# Patient Record
Sex: MALE | Race: WHITE | Employment: OTHER | URBAN - METROPOLITAN AREA
[De-identification: names, ages, dates, MRNs, and addresses within clinical notes are randomized per-mention and may not be internally consistent; named-entity substitution may affect disease eponyms.]

---

## 2017-07-11 ENCOUNTER — TELEPHONE (OUTPATIENT)
Dept: INTERNAL MEDICINE CLINIC | Age: 62
End: 2017-07-11

## 2017-07-11 DIAGNOSIS — Z12.5 PROSTATE CANCER SCREENING: Primary | ICD-10-CM

## 2017-09-12 ENCOUNTER — OFFICE VISIT (OUTPATIENT)
Dept: INTERNAL MEDICINE CLINIC | Age: 62
End: 2017-09-12

## 2017-09-12 VITALS
HEART RATE: 68 BPM | BODY MASS INDEX: 24.05 KG/M2 | SYSTOLIC BLOOD PRESSURE: 122 MMHG | WEIGHT: 168 LBS | DIASTOLIC BLOOD PRESSURE: 70 MMHG | HEIGHT: 70 IN

## 2017-09-12 DIAGNOSIS — Z83.71 FAMILY HX COLONIC POLYPS: ICD-10-CM

## 2017-09-12 DIAGNOSIS — Z00.00 WELL ADULT EXAM: ICD-10-CM

## 2017-09-12 DIAGNOSIS — E78.00 PURE HYPERCHOLESTEROLEMIA: Primary | ICD-10-CM

## 2017-09-12 LAB
BILIRUBIN, POC: NORMAL
BLOOD URINE, POC: NORMAL
CLARITY, POC: CLEAR
COLOR, POC: NORMAL
GLUCOSE URINE, POC: NORMAL
KETONES, POC: NORMAL
LEUKOCYTE EST, POC: NORMAL
NITRITE, POC: NORMAL
PH, POC: 5
PROTEIN, POC: NORMAL
SPECIFIC GRAVITY, POC: 1.01
UROBILINOGEN, POC: 1

## 2017-09-12 PROCEDURE — 93000 ELECTROCARDIOGRAM COMPLETE: CPT | Performed by: INTERNAL MEDICINE

## 2017-09-12 PROCEDURE — 81002 URINALYSIS NONAUTO W/O SCOPE: CPT | Performed by: INTERNAL MEDICINE

## 2017-09-12 PROCEDURE — 99396 PREV VISIT EST AGE 40-64: CPT | Performed by: INTERNAL MEDICINE

## 2017-09-12 RX ORDER — ATORVASTATIN CALCIUM 10 MG/1
TABLET, FILM COATED ORAL
Qty: 90 TABLET | Refills: 3 | Status: SHIPPED | OUTPATIENT
Start: 2017-09-12 | End: 2018-09-07 | Stop reason: SDUPTHER

## 2017-09-12 ASSESSMENT — PATIENT HEALTH QUESTIONNAIRE - PHQ9
1. LITTLE INTEREST OR PLEASURE IN DOING THINGS: 0
SUM OF ALL RESPONSES TO PHQ9 QUESTIONS 1 & 2: 0
SUM OF ALL RESPONSES TO PHQ QUESTIONS 1-9: 0
2. FEELING DOWN, DEPRESSED OR HOPELESS: 0

## 2017-11-02 ENCOUNTER — HOSPITAL ENCOUNTER (OUTPATIENT)
Dept: ENDOSCOPY | Age: 62
Discharge: HOME | End: 2017-11-02
Attending: INTERNAL MEDICINE | Admitting: INTERNAL MEDICINE

## 2017-11-02 RX ORDER — SODIUM CHLORIDE 0.9 % (FLUSH) 0.9 %
10 SYRINGE (ML) INJECTION PRN
Status: CANCELLED | OUTPATIENT
Start: 2017-11-02

## 2017-11-02 RX ORDER — SODIUM CHLORIDE 0.9 % (FLUSH) 0.9 %
10 SYRINGE (ML) INJECTION EVERY 12 HOURS SCHEDULED
Status: CANCELLED | OUTPATIENT
Start: 2017-11-02

## 2017-11-02 RX ORDER — SODIUM CHLORIDE 9 MG/ML
INJECTION, SOLUTION INTRAVENOUS CONTINUOUS
Status: CANCELLED | OUTPATIENT
Start: 2017-11-02

## 2017-11-02 ASSESSMENT — ENCOUNTER SYMPTOMS: SHORTNESS OF BREATH: 0

## 2017-11-02 NOTE — ANESTHESIA POST-OP
[facility] Anesthesiology  Post-Anesthesia Note       Name:  You Carpio                                         Age:  58 y.o. MRN:  [id number]     Last Vitals & Oxygen Saturation: There were no vitals taken for this visit. No data found. Level of consciousness:  Awake, alert    Respiratory: Respirations easy, no distress. Stable. Cardiovascular: Hemodynamically stable. Hydration: Adequate. PONV: Adequately managed. Post-op pain: Adequately controlled. Post-op assessment: Tolerated anesthetic well without complication. Complications:  None.     Inna Maradiaga MD  November 2, 2017   12:59 PM

## 2017-11-02 NOTE — ANESTHESIA PRE-OP
by mouth daily 90 tablet 3    aspirin 81 MG chewable tablet Take 81 mg by mouth daily.  therapeutic multivitamin-minerals (THERAGRAN-M) tablet Take 1 tablet by mouth daily.  Calcium Carb-Cholecalciferol (CALCIUM PLUS VITAMIN D3) 600-500 MG-UNIT CAPS Take 1 tablet by mouth daily.  Flaxseed, Linseed, 1000 MG CAPS Take 1 capsule by mouth daily.  Saw Palmetto, Serenoa repens, 450 MG CAPS Take 1 capsule by mouth daily. No current facility-administered medications for this encounter. Vital Signs  (Current) There were no vitals filed for this visit.   (for past 48 hrs)  No Data Recorded  (last three values)   BP Readings from Last 3 Encounters:   09/12/17 122/70   09/28/16 140/74   06/08/16 132/76       CBC  Lab Results   Component Value Date    WBC 4.8 08/29/2017    RBC 4.97 08/29/2017    HGB 15.3 08/29/2017    HCT 45.5 08/29/2017    MCV 92 08/29/2017    RDW 13.0 08/29/2017     08/29/2017       CMP    Lab Results   Component Value Date     08/29/2017    K 5.1 08/29/2017     08/29/2017    CO2 28 08/29/2017    BUN 21 08/29/2017    CREATININE 1.16 08/29/2017    GFRAA 78 08/29/2017    GFRAA >60 10/05/2012    AGRATIO 2.5 08/29/2017    LABGLOM 67 08/29/2017    GLUCOSE 90 08/29/2017    PROT 6.3 08/29/2017    PROT 6.8 10/05/2012    CALCIUM 9.8 08/29/2017    BILITOT 0.6 08/29/2017    ALKPHOS 48 08/29/2017    AST 24 08/29/2017    ALT 20 08/29/2017       BMP    Lab Results   Component Value Date     08/29/2017    K 5.1 08/29/2017     08/29/2017    CO2 28 08/29/2017    BUN 21 08/29/2017    CREATININE 1.16 08/29/2017    CALCIUM 9.8 08/29/2017    GFRAA 78 08/29/2017    GFRAA >60 10/05/2012    LABGLOM 67 08/29/2017    GLUCOSE 90 08/29/2017       Coags   No results found for: PROTIME, INR, APTT    HCG (If Applicable) No results found for: PREGTESTUR, PREGSERUM, HCG, HCGQUANT     ABGs  No results found for: PHART, PO2ART, RRI0CWP, QSN2VNS, BEART, C3HYWWCD     Type & Screen (If Applicable)  No results found for: LABABO, LABRH      POCGlucose  No results for input(s): GLUCOSE in the last 72 hours. NPO Status  > 8 hours                       BMI  There is no height or weight on file to calculate BMI. Estimated body mass index is 24.11 kg/m² as calculated from the following:    Height as of 9/12/17: 5' 10\" (1.778 m). Weight as of 9/12/17: 168 lb (76.2 kg). Additional Testing (Echo, Stress, ECG, PFTs, etc)        Anesthesia Evaluation  Patient summary reviewed and Nursing notes reviewed no history of anesthetic complications:   Airway: Mallampati: II  TM distance: >3 FB   Neck ROM: full  Mouth opening: > = 3 FB Dental:          Pulmonary:       (-) pneumonia, COPD, asthma, shortness of breath, recent URI and sleep apnea                           Cardiovascular:  Exercise tolerance: good (>4 METS),       (-) pacemaker, hypertension, valvular problems/murmurs, past MI, CAD, CABG/stent, dysrhythmias,  angina,  CHF, orthopnea, PND and  LIMON      Rhythm: regular  Rate: normal                    Neuro/Psych:      (-) seizures, neuromuscular disease, TIA, CVA, headaches and psychiatric history           GI/Hepatic/Renal:   (+) GERD:,      (-) hiatal hernia, PUD, hepatitis, liver disease and bowel prep       Endo/Other:        (-) hypothyroidism, hyperthyroidism, blood dyscrasia, arthritis, no Type II DM, no Type I DM               Abdominal:           Vascular:                                      Anesthesia Plan      MAC     ASA 2       Induction: intravenous. Anesthetic plan and risks discussed with patient. Plan discussed with CRNA. DOS STAFF ADDENDUM:    Pt seen and examined, chart reviewed (including anesthesia, drug and allergy history). No interval changes to history and physical examination. Anesthetic plan, risks, benefits, alternatives, and personnel involved discussed with patient. Patient verbalized an understanding and agrees to proceed. Pamela Abdi MD  November 2, 2017  6:15 AM

## 2017-12-15 ENCOUNTER — OFFICE VISIT (OUTPATIENT)
Dept: INTERNAL MEDICINE CLINIC | Age: 62
End: 2017-12-15

## 2017-12-15 VITALS
HEIGHT: 70 IN | HEART RATE: 64 BPM | SYSTOLIC BLOOD PRESSURE: 120 MMHG | BODY MASS INDEX: 23.91 KG/M2 | TEMPERATURE: 97.4 F | DIASTOLIC BLOOD PRESSURE: 66 MMHG | WEIGHT: 167 LBS

## 2017-12-15 DIAGNOSIS — J01.00 ACUTE NON-RECURRENT MAXILLARY SINUSITIS: Primary | ICD-10-CM

## 2017-12-15 PROCEDURE — 1036F TOBACCO NON-USER: CPT | Performed by: INTERNAL MEDICINE

## 2017-12-15 PROCEDURE — G8427 DOCREV CUR MEDS BY ELIG CLIN: HCPCS | Performed by: INTERNAL MEDICINE

## 2017-12-15 PROCEDURE — 3017F COLORECTAL CA SCREEN DOC REV: CPT | Performed by: INTERNAL MEDICINE

## 2017-12-15 PROCEDURE — G8420 CALC BMI NORM PARAMETERS: HCPCS | Performed by: INTERNAL MEDICINE

## 2017-12-15 PROCEDURE — G8482 FLU IMMUNIZE ORDER/ADMIN: HCPCS | Performed by: INTERNAL MEDICINE

## 2017-12-15 PROCEDURE — 99213 OFFICE O/P EST LOW 20 MIN: CPT | Performed by: INTERNAL MEDICINE

## 2017-12-15 RX ORDER — AZELASTINE 1 MG/ML
2 SPRAY, METERED NASAL 2 TIMES DAILY
Qty: 1 BOTTLE | Refills: 3 | Status: SHIPPED | OUTPATIENT
Start: 2017-12-15 | End: 2018-09-24

## 2017-12-15 RX ORDER — AMOXICILLIN 500 MG
1 CAPSULE ORAL DAILY
COMMUNITY

## 2017-12-15 RX ORDER — AMOXICILLIN AND CLAVULANATE POTASSIUM 875; 125 MG/1; MG/1
1 TABLET, FILM COATED ORAL 2 TIMES DAILY
Qty: 20 TABLET | Refills: 0 | Status: SHIPPED | OUTPATIENT
Start: 2017-12-15 | End: 2017-12-25

## 2018-09-07 ENCOUNTER — TELEPHONE (OUTPATIENT)
Dept: INTERNAL MEDICINE CLINIC | Age: 63
End: 2018-09-07

## 2018-09-07 DIAGNOSIS — E78.00 PURE HYPERCHOLESTEROLEMIA: ICD-10-CM

## 2018-09-07 RX ORDER — ATORVASTATIN CALCIUM 10 MG/1
TABLET, FILM COATED ORAL
Qty: 90 TABLET | Refills: 3 | Status: SHIPPED | OUTPATIENT
Start: 2018-09-07 | End: 2019-11-04 | Stop reason: SDUPTHER

## 2018-09-07 NOTE — TELEPHONE ENCOUNTER
Pt called and requested a 90 day refill on Lipitor sent to [facility] on [facility]

## 2018-09-24 ENCOUNTER — OFFICE VISIT (OUTPATIENT)
Dept: INTERNAL MEDICINE CLINIC | Age: 63
End: 2018-09-24

## 2018-09-24 VITALS
BODY MASS INDEX: 24.48 KG/M2 | SYSTOLIC BLOOD PRESSURE: 120 MMHG | DIASTOLIC BLOOD PRESSURE: 70 MMHG | WEIGHT: 171 LBS | HEART RATE: 76 BPM | HEIGHT: 70 IN

## 2018-09-24 DIAGNOSIS — R97.20 PSA ELEVATION: ICD-10-CM

## 2018-09-24 DIAGNOSIS — Z00.00 ANNUAL PHYSICAL EXAM: Primary | ICD-10-CM

## 2018-09-24 LAB
BILIRUBIN, POC: NORMAL
BLOOD URINE, POC: NORMAL
CLARITY, POC: CLEAR
COLOR, POC: NORMAL
GLUCOSE URINE, POC: NORMAL
KETONES, POC: NORMAL
LEUKOCYTE EST, POC: NORMAL
NITRITE, POC: NORMAL
PH, POC: 8
PROTEIN, POC: NORMAL
SPECIFIC GRAVITY, POC: 1
UROBILINOGEN, POC: 1

## 2018-09-24 PROCEDURE — 93000 ELECTROCARDIOGRAM COMPLETE: CPT | Performed by: INTERNAL MEDICINE

## 2018-09-24 PROCEDURE — 99396 PREV VISIT EST AGE 40-64: CPT | Performed by: INTERNAL MEDICINE

## 2018-09-24 PROCEDURE — 81002 URINALYSIS NONAUTO W/O SCOPE: CPT | Performed by: INTERNAL MEDICINE

## 2018-09-24 ASSESSMENT — PATIENT HEALTH QUESTIONNAIRE - PHQ9
SUM OF ALL RESPONSES TO PHQ QUESTIONS 1-9: 0
SUM OF ALL RESPONSES TO PHQ QUESTIONS 1-9: 0
SUM OF ALL RESPONSES TO PHQ9 QUESTIONS 1 & 2: 0
2. FEELING DOWN, DEPRESSED OR HOPELESS: 0
1. LITTLE INTEREST OR PLEASURE IN DOING THINGS: 0

## 2018-09-24 NOTE — PROGRESS NOTES
Subjective:      Patient ID: Shruthi Mccauley is a 61 y.o. male. HPI-here for annual CPX-no complaints or problems  Wants his KATY done  MY DAD HAD A BIOPSY   I WASN'T TO SEE UROLOGY SINCE MY PSA WAS 3.6    Review of Systems-had colon 11/2017, next due 2027    Current Outpatient Prescriptions on File Prior to Visit   Medication Sig Dispense Refill    atorvastatin (LIPITOR) 10 MG tablet TAKE 1 TABLET BY MOUTH ONE TIME A DAY 90 tablet 3    RaNITidine HCl (ZANTAC 150 MAXIMUM STRENGTH PO) Take 1 tablet by mouth 2 times daily      Omega-3 Fatty Acids (FISH OIL) 1200 MG CAPS Take 1 capsule by mouth daily      aspirin 81 MG chewable tablet Take 81 mg by mouth daily.  therapeutic multivitamin-minerals (THERAGRAN-M) tablet Take 1 tablet by mouth daily.  Calcium Carb-Cholecalciferol (CALCIUM PLUS VITAMIN D3) 600-500 MG-UNIT CAPS Take 1 tablet by mouth daily.  Saw Palmetto, Serenoa repens, 450 MG CAPS Take 1 capsule by mouth daily. No current facility-administered medications on file prior to visit. Objective:   Physical Exam   Constitutional: He is oriented to person, place, and time. He appears well-developed and well-nourished. /70   Pulse 76   Ht 5' 10\" (1.778 m)   Wt 171 lb (77.6 kg)   BMI 24.54 kg/m²    Neck: No JVD present. Cardiovascular: Normal rate, regular rhythm and normal heart sounds. Pulmonary/Chest: Effort normal and breath sounds normal.   Abdominal: Soft. Bowel sounds are normal.   Musculoskeletal: Normal range of motion. Neurological: He is alert and oriented to person, place, and time. Psychiatric: He has a normal mood and affect. His behavior is normal. Thought content normal.   Nursing note and vitals reviewed.     Lab Results   Component Value Date    PSA 3.6 09/05/2018    PSA 2.8 08/29/2017    PSA 2.5 08/31/2016     Lab Results   Component Value Date    WBC 3.9 09/05/2018    HGB 14.3 09/05/2018    HCT 43.6 09/05/2018    MCV 91 09/05/2018

## 2018-09-24 NOTE — PATIENT INSTRUCTIONS
Annual fluvax is advised every Fall    Consider Shingrix series of 2 shots over 2-6 month for shingles protection

## 2019-04-01 ENCOUNTER — OFFICE VISIT (OUTPATIENT)
Dept: INTERNAL MEDICINE CLINIC | Age: 64
End: 2019-04-01

## 2019-04-01 VITALS
HEART RATE: 97 BPM | WEIGHT: 168 LBS | BODY MASS INDEX: 24.11 KG/M2 | DIASTOLIC BLOOD PRESSURE: 90 MMHG | OXYGEN SATURATION: 100 % | SYSTOLIC BLOOD PRESSURE: 140 MMHG

## 2019-04-01 DIAGNOSIS — H92.02 LEFT EAR PAIN: Primary | ICD-10-CM

## 2019-04-01 PROCEDURE — 3017F COLORECTAL CA SCREEN DOC REV: CPT | Performed by: INTERNAL MEDICINE

## 2019-04-01 PROCEDURE — G8428 CUR MEDS NOT DOCUMENT: HCPCS | Performed by: INTERNAL MEDICINE

## 2019-04-01 PROCEDURE — 99213 OFFICE O/P EST LOW 20 MIN: CPT | Performed by: INTERNAL MEDICINE

## 2019-04-01 PROCEDURE — G8420 CALC BMI NORM PARAMETERS: HCPCS | Performed by: INTERNAL MEDICINE

## 2019-04-01 PROCEDURE — 1036F TOBACCO NON-USER: CPT | Performed by: INTERNAL MEDICINE

## 2019-04-01 RX ORDER — MONTELUKAST SODIUM 10 MG/1
10 TABLET ORAL DAILY
Qty: 30 TABLET | Refills: 0 | Status: SHIPPED | OUTPATIENT
Start: 2019-04-01 | End: 2020-06-19 | Stop reason: ALTCHOICE

## 2019-04-01 NOTE — PROGRESS NOTES
dysfuction      Plan:      Afrin NS each nostrils BID x 3 days  Start Singulair 10 mg QD x 1 month, then transition to OTC antihistamines as needed    Vickie Whitley MD

## 2019-07-15 ENCOUNTER — TELEPHONE (OUTPATIENT)
Dept: PRIMARY CARE CLINIC | Age: 64
End: 2019-07-15

## 2019-07-15 DIAGNOSIS — Z00.00 ROUTINE GENERAL MEDICAL EXAMINATION AT A HEALTH CARE FACILITY: Primary | ICD-10-CM

## 2019-07-15 DIAGNOSIS — Z12.5 SCREENING FOR PROSTATE CANCER: ICD-10-CM

## 2019-09-23 ENCOUNTER — PATIENT MESSAGE (OUTPATIENT)
Dept: PRIMARY CARE CLINIC | Age: 64
End: 2019-09-23

## 2019-09-25 ENCOUNTER — OFFICE VISIT (OUTPATIENT)
Dept: PRIMARY CARE CLINIC | Age: 64
End: 2019-09-25
Payer: COMMERCIAL

## 2019-09-25 VITALS
HEIGHT: 71 IN | DIASTOLIC BLOOD PRESSURE: 82 MMHG | WEIGHT: 172.4 LBS | HEART RATE: 73 BPM | BODY MASS INDEX: 24.14 KG/M2 | OXYGEN SATURATION: 99 % | SYSTOLIC BLOOD PRESSURE: 126 MMHG | TEMPERATURE: 97.5 F

## 2019-09-25 DIAGNOSIS — Z23 NEED FOR VACCINATION FOR H FLU TYPE B: ICD-10-CM

## 2019-09-25 DIAGNOSIS — N40.1 BENIGN PROSTATIC HYPERPLASIA WITH URINARY HESITANCY: ICD-10-CM

## 2019-09-25 DIAGNOSIS — Z12.11 COLON CANCER SCREENING: ICD-10-CM

## 2019-09-25 DIAGNOSIS — R39.11 BENIGN PROSTATIC HYPERPLASIA WITH URINARY HESITANCY: ICD-10-CM

## 2019-09-25 DIAGNOSIS — Z12.5 PROSTATE CANCER SCREENING: ICD-10-CM

## 2019-09-25 DIAGNOSIS — Z00.00 ROUTINE GENERAL MEDICAL EXAMINATION AT A HEALTH CARE FACILITY: Primary | ICD-10-CM

## 2019-09-25 LAB
BILIRUBIN, POC: ABNORMAL
BLOOD URINE, POC: ABNORMAL
CLARITY, POC: ABNORMAL
COLOR, POC: YELLOW
GLUCOSE URINE, POC: ABNORMAL
KETONES, POC: ABNORMAL
LEUKOCYTE EST, POC: ABNORMAL
NITRITE, POC: ABNORMAL
PH, POC: 7
PROTEIN, POC: ABNORMAL
SPECIFIC GRAVITY, POC: 1.01
UROBILINOGEN, POC: 0.2

## 2019-09-25 PROCEDURE — 99213 OFFICE O/P EST LOW 20 MIN: CPT | Performed by: INTERNAL MEDICINE

## 2019-09-25 PROCEDURE — 81002 URINALYSIS NONAUTO W/O SCOPE: CPT | Performed by: INTERNAL MEDICINE

## 2019-09-25 PROCEDURE — 90686 IIV4 VACC NO PRSV 0.5 ML IM: CPT | Performed by: INTERNAL MEDICINE

## 2019-09-25 PROCEDURE — 90471 IMMUNIZATION ADMIN: CPT | Performed by: INTERNAL MEDICINE

## 2019-09-25 RX ORDER — TAMSULOSIN HYDROCHLORIDE 0.4 MG/1
0.4 CAPSULE ORAL DAILY
Qty: 30 CAPSULE | Refills: 3 | Status: SHIPPED | OUTPATIENT
Start: 2019-09-25 | End: 2020-06-19 | Stop reason: ALTCHOICE

## 2019-09-25 ASSESSMENT — ENCOUNTER SYMPTOMS
COUGH: 0
SHORTNESS OF BREATH: 0

## 2019-09-25 ASSESSMENT — PATIENT HEALTH QUESTIONNAIRE - PHQ9
1. LITTLE INTEREST OR PLEASURE IN DOING THINGS: 0
SUM OF ALL RESPONSES TO PHQ9 QUESTIONS 1 & 2: 0
SUM OF ALL RESPONSES TO PHQ QUESTIONS 1-9: 0
SUM OF ALL RESPONSES TO PHQ QUESTIONS 1-9: 0
2. FEELING DOWN, DEPRESSED OR HOPELESS: 0

## 2019-09-25 NOTE — PROGRESS NOTES
Vaccine Information Sheet, \"Influenza - Inactivated\"  given to Tavo Mujica, or parent/legal guardian of  Tavo Mujica and verbalized understanding. Patient responses:    Have you ever had a reaction to a flu vaccine? No  Are you able to eat eggs without adverse effects? Yes  Do you have any current illness? No  Have you ever had Guillian Mason Syndrome? No    Flu vaccine given per order. Please see immunization tab. Janine [place]

## 2019-09-25 NOTE — PROGRESS NOTES
Chief Complaint   Patient presents with    Annual Exam     CPE, would like to discuss enlarged prostate     Subjective:      Patient ID: Jaren Cornejo is a 59 y.o. male. HPI-presents for a CPX  \"wants to discuss enlarged prostate\", already on TXU Omar  I saw Urology [facility] last year for elevated PSA    Review of Systems   Respiratory: Negative for cough and shortness of breath. Cardiovascular: Positive for chest pain. Negative for palpitations and leg swelling. Denies any exertional symptoms        Current Outpatient Medications on File Prior to Visit   Medication Sig Dispense Refill    montelukast (SINGULAIR) 10 MG tablet Take 1 tablet by mouth daily 30 tablet 0    atorvastatin (LIPITOR) 10 MG tablet TAKE 1 TABLET BY MOUTH ONE TIME A DAY 90 tablet 3    RaNITidine HCl (ZANTAC 150 MAXIMUM STRENGTH PO) Take 1 tablet by mouth 2 times daily      Omega-3 Fatty Acids (FISH OIL) 1200 MG CAPS Take 1 capsule by mouth daily      aspirin 81 MG chewable tablet Take 81 mg by mouth daily.  therapeutic multivitamin-minerals (THERAGRAN-M) tablet Take 1 tablet by mouth daily.  Calcium Carb-Cholecalciferol (CALCIUM PLUS VITAMIN D3) 600-500 MG-UNIT CAPS Take 1 tablet by mouth daily.  Saw Palmetto, Serenoa repens, 450 MG CAPS Take 1 capsule by mouth daily. No current facility-administered medications on file prior to visit. Objective:   Physical Exam   Constitutional: He is oriented to person, place, and time. He appears well-developed and well-nourished. No distress. /82 (Site: Left Upper Arm, Position: Sitting, Cuff Size: Medium Adult)   Pulse 73   Temp 97.5 °F (36.4 °C) (Tympanic)   Ht 5' 11\" (1.803 m)   Wt 172 lb 6.4 oz (78.2 kg)   SpO2 99%   BMI 24.04 kg/m²    HENT:   Head: Normocephalic and atraumatic. Eyes: Pupils are equal, round, and reactive to light. Conjunctivae are normal. Right eye exhibits no discharge. Left eye exhibits no discharge.  No

## 2019-11-04 ENCOUNTER — OFFICE VISIT (OUTPATIENT)
Dept: PRIMARY CARE CLINIC | Age: 64
End: 2019-11-04
Payer: COMMERCIAL

## 2019-11-04 VITALS
OXYGEN SATURATION: 97 % | TEMPERATURE: 98.2 F | BODY MASS INDEX: 23.99 KG/M2 | HEART RATE: 83 BPM | WEIGHT: 172 LBS | SYSTOLIC BLOOD PRESSURE: 142 MMHG | DIASTOLIC BLOOD PRESSURE: 92 MMHG

## 2019-11-04 DIAGNOSIS — E78.00 PURE HYPERCHOLESTEROLEMIA: ICD-10-CM

## 2019-11-04 DIAGNOSIS — K52.9 GASTROENTERITIS: Primary | ICD-10-CM

## 2019-11-04 PROCEDURE — G8420 CALC BMI NORM PARAMETERS: HCPCS | Performed by: INTERNAL MEDICINE

## 2019-11-04 PROCEDURE — G8427 DOCREV CUR MEDS BY ELIG CLIN: HCPCS | Performed by: INTERNAL MEDICINE

## 2019-11-04 PROCEDURE — G8482 FLU IMMUNIZE ORDER/ADMIN: HCPCS | Performed by: INTERNAL MEDICINE

## 2019-11-04 PROCEDURE — 3017F COLORECTAL CA SCREEN DOC REV: CPT | Performed by: INTERNAL MEDICINE

## 2019-11-04 PROCEDURE — 99213 OFFICE O/P EST LOW 20 MIN: CPT | Performed by: INTERNAL MEDICINE

## 2019-11-04 PROCEDURE — 1036F TOBACCO NON-USER: CPT | Performed by: INTERNAL MEDICINE

## 2019-11-04 RX ORDER — ATORVASTATIN CALCIUM 10 MG/1
TABLET, FILM COATED ORAL
Qty: 90 TABLET | Refills: 3 | Status: SHIPPED | OUTPATIENT
Start: 2019-11-04 | End: 2020-11-24

## 2019-11-04 RX ORDER — DICYCLOMINE HCL 20 MG
20 TABLET ORAL 3 TIMES DAILY PRN
Qty: 120 TABLET | Refills: 3 | Status: SHIPPED | OUTPATIENT
Start: 2019-11-04 | End: 2020-06-19 | Stop reason: ALTCHOICE

## 2019-11-04 ASSESSMENT — ENCOUNTER SYMPTOMS
RHINORRHEA: 1
SINUS PRESSURE: 1
ABDOMINAL DISTENTION: 0
SINUS PAIN: 1
DIARRHEA: 0
ABDOMINAL PAIN: 1
NAUSEA: 0
RECTAL PAIN: 0
CONSTIPATION: 0

## 2019-11-13 ENCOUNTER — TELEPHONE (OUTPATIENT)
Dept: PRIMARY CARE CLINIC | Age: 64
End: 2019-11-13

## 2019-12-13 ENCOUNTER — HOSPITAL ENCOUNTER (OUTPATIENT)
Age: 64
Discharge: HOME | End: 2019-12-13
Payer: COMMERCIAL

## 2019-12-13 LAB
BUN BLDV-MCNC: 20 MG/DL (ref 7–20)
CREAT SERPL-MCNC: 1 MG/DL (ref 0.8–1.3)
GFR AFRICAN AMERICAN: >60
GFR NON-AFRICAN AMERICAN: >60

## 2019-12-13 PROCEDURE — 84520 ASSAY OF UREA NITROGEN: CPT

## 2019-12-13 PROCEDURE — 36415 COLL VENOUS BLD VENIPUNCTURE: CPT

## 2019-12-13 PROCEDURE — 82565 ASSAY OF CREATININE: CPT

## 2019-12-13 PROCEDURE — 83520 IMMUNOASSAY QUANT NOS NONAB: CPT

## 2019-12-15 LAB — PANCREATIC ELASTASE, FECAL: >500 UG/G

## 2019-12-19 ENCOUNTER — HOSPITAL ENCOUNTER (OUTPATIENT)
Dept: CT IMAGING | Age: 64
Discharge: HOME | End: 2019-12-19
Payer: COMMERCIAL

## 2019-12-19 DIAGNOSIS — K52.9 GASTROENTERITIS: ICD-10-CM

## 2019-12-19 PROCEDURE — 6360000004 HC RX CONTRAST MEDICATION: Performed by: INTERNAL MEDICINE

## 2019-12-19 PROCEDURE — 74177 CT ABD & PELVIS W/CONTRAST: CPT

## 2019-12-19 RX ADMIN — IOHEXOL 50 ML: 240 INJECTION, SOLUTION INTRATHECAL; INTRAVASCULAR; INTRAVENOUS; ORAL at 15:48

## 2019-12-19 RX ADMIN — IOPAMIDOL 75 ML: 755 INJECTION, SOLUTION INTRAVENOUS at 15:48

## 2020-03-18 ENCOUNTER — TELEPHONE (OUTPATIENT)
Dept: PRIMARY CARE CLINIC | Age: 65
End: 2020-03-18

## 2020-06-19 ENCOUNTER — OFFICE VISIT (OUTPATIENT)
Dept: PRIMARY CARE CLINIC | Age: 65
End: 2020-06-19
Payer: COMMERCIAL

## 2020-06-19 VITALS
OXYGEN SATURATION: 93 % | DIASTOLIC BLOOD PRESSURE: 82 MMHG | HEIGHT: 71 IN | HEART RATE: 113 BPM | WEIGHT: 167 LBS | TEMPERATURE: 98.3 F | SYSTOLIC BLOOD PRESSURE: 134 MMHG | BODY MASS INDEX: 23.38 KG/M2

## 2020-06-19 LAB
BILIRUBIN, POC: ABNORMAL
BLOOD URINE, POC: ABNORMAL
CLARITY, POC: CLEAR
COLOR, POC: YELLOW
GLUCOSE URINE, POC: ABNORMAL
KETONES, POC: ABNORMAL
LEUKOCYTE EST, POC: ABNORMAL
NITRITE, POC: ABNORMAL
PH, POC: 7
PROTEIN, POC: ABNORMAL
SPECIFIC GRAVITY, POC: 1.01
UROBILINOGEN, POC: 0.2

## 2020-06-19 PROCEDURE — 99396 PREV VISIT EST AGE 40-64: CPT | Performed by: INTERNAL MEDICINE

## 2020-06-19 PROCEDURE — 81002 URINALYSIS NONAUTO W/O SCOPE: CPT | Performed by: INTERNAL MEDICINE

## 2020-06-19 ASSESSMENT — PATIENT HEALTH QUESTIONNAIRE - PHQ9
SUM OF ALL RESPONSES TO PHQ9 QUESTIONS 1 & 2: 0
SUM OF ALL RESPONSES TO PHQ QUESTIONS 1-9: 0
2. FEELING DOWN, DEPRESSED OR HOPELESS: 0
1. LITTLE INTEREST OR PLEASURE IN DOING THINGS: 0
SUM OF ALL RESPONSES TO PHQ QUESTIONS 1-9: 0

## 2020-06-19 ASSESSMENT — ENCOUNTER SYMPTOMS
ABDOMINAL DISTENTION: 0
COUGH: 0
BACK PAIN: 0
DIARRHEA: 0
ABDOMINAL PAIN: 0
CHEST TIGHTNESS: 0
SHORTNESS OF BREATH: 0
NAUSEA: 0

## 2020-06-19 NOTE — PROGRESS NOTES
6/19/2020   [facility]  1955    The patients PMH, surgical history, family history, medications, allergies were all reviewed and updated as appropriate today. Current Outpatient Medications on File Prior to Visit   Medication Sig Dispense Refill    atorvastatin (LIPITOR) 10 MG tablet TAKE 1 TABLET BY MOUTH ONE TIME A DAY 90 tablet 3    dicyclomine (BENTYL) 20 MG tablet Take 1 tablet by mouth 3 times daily as needed (abd cramps) 120 tablet 3    tamsulosin (FLOMAX) 0.4 MG capsule Take 1 capsule by mouth daily 30 capsule 3    montelukast (SINGULAIR) 10 MG tablet Take 1 tablet by mouth daily 30 tablet 0    RaNITidine HCl (ZANTAC 150 MAXIMUM STRENGTH PO) Take 1 tablet by mouth 2 times daily      Omega-3 Fatty Acids (FISH OIL) 1200 MG CAPS Take 1 capsule by mouth daily      aspirin 81 MG chewable tablet Take 81 mg by mouth daily.  therapeutic multivitamin-minerals (THERAGRAN-M) tablet Take 1 tablet by mouth daily.  Calcium Carb-Cholecalciferol (CALCIUM PLUS VITAMIN D3) 600-500 MG-UNIT CAPS Take 1 tablet by mouth daily.  Saw Palmetto, Serenoa repens, 450 MG CAPS Take 1 capsule by mouth daily. No current facility-administered medications on file prior to visit.          Chief Complaint   Patient presents with    Annual Exam     CPE, labs done       HPI:  Here for his ANNUAL COMPLETE PHYSICAL exam  Wants lab results printed for today  Wants UA done today    Wants GI referral for another colonoscopy, last done Jose Antonio Jimenez 11/2/2017    Past Medical History:   Diagnosis Date    Hyperlipidemia      Past Surgical History:   Procedure Laterality Date   [address] and 2003    cataract both eyes    NECK SURGERY  2009    Rozina timmons at [facility]     Family History   Problem Relation Age of Onset    Depression Mother     Vision Loss Mother     Diabetes Father     Hearing Loss Father     Heart Disease Father     High Cholesterol Father     Vision Loss Father     Cancer seconds. Coloration: Skin is not pale. Findings: No erythema or rash. Neurological:      Mental Status: He is alert and oriented to person, place, and time. Cranial Nerves: No cranial nerve deficit. Sensory: No sensory deficit. Motor: No abnormal muscle tone. Deep Tendon Reflexes: Reflexes normal.   Psychiatric:         Mood and Affect: Mood normal.         Behavior: Behavior normal.         Thought Content:  Thought content normal.         Judgment: Judgment normal.       UA-trace WBC    Data Review:   CBC:   Lab Results   Component Value Date    WBC 6.1 06/12/2020    WBC 4.4 11/04/2019    WBC 4.0 09/20/2019    HGB 14.4 06/12/2020    HGB 13.7 11/04/2019    HGB 14.9 09/20/2019    HCT 44.1 06/12/2020    HCT 41.4 11/04/2019    HCT 44.2 09/20/2019    MCV 94 06/12/2020    MCV 91 11/04/2019    MCV 91 09/20/2019     06/12/2020     11/04/2019     09/20/2019     Chemistry:   Lab Results   Component Value Date     06/12/2020     11/04/2019     09/20/2019    K 5.0 06/12/2020    K 4.4 11/04/2019    K 4.5 09/20/2019     06/12/2020     11/04/2019     09/20/2019    CO2 29 06/12/2020    CO2 26 11/04/2019    CO2 28 09/20/2019    BUN 22 06/12/2020    BUN 20 12/13/2019    BUN 13 11/04/2019    CREATININE 1.11 06/12/2020    CREATININE 1.0 12/13/2019    CREATININE 1.01 11/04/2019     Hepatic Function:   Lab Results   Component Value Date    AST 27 06/12/2020    AST 33 11/04/2019    AST 34 09/20/2019    ALT 23 06/12/2020    ALT 26 11/04/2019    ALT 23 09/20/2019    BILITOT 1.2 06/12/2020    BILITOT 0.5 11/04/2019    BILITOT 0.9 09/20/2019    ALKPHOS 57 06/12/2020    ALKPHOS 54 11/04/2019    ALKPHOS 52 09/20/2019     Lab Results   Component Value Date    LIPASE 40 12/14/2015    AMYLASE 63 11/04/2019    AMYLASE 87 12/14/2015     Lipids:   Lab Results   Component Value Date    CHOL 139 06/12/2020    HDL 64 06/12/2020    TRIG 50 06/12/2020 ASSESSMENT/PLAN  1.  Routine general medical examination at health care facility    - POCT Urinalysis no Micro  Check urine culture due to pyuria today     2.) Colon CA screen-last done 11/2017 Fredy Rued, due again for my stomach issues He told me 10 years but I want it done in 5 years since I JUST WANT TO MAKE SURE  \"stools not right\" I had EDG, we did scan to check my organs, I did pancrease test that was OK  We tried Bentyl and that made me nervous so he put me om Benefiber and Immodium  He thinks that I have IBS  I CAN PINPOINT THE DATE    3.) doesn't sees Urology for KATY, I had KATY in Septmber 2019  I'M [address] ON MEDICARE NEXT MONTH  My PSA was abnormal YOU SENT ME TO HIM  I STOPPED TAKING MY FLOMAX SO I STOPPED EVERYTHING SINCE MY BOWELS WERE UPSET  Went to [facility] for stool tests    4.) pt upset that BS and A1C was WNL    5.) Hyperlipidemia-no refills needed    6.) pt reports now on Protonix per Dr. Daniel Rued    7.) wants testosterone level checked with next labs    Consider shingrix in future    Electronically signed by Gareth Wiggins MD on 6/19/2020 at 9:03 AM

## 2020-06-20 LAB — URINE CULTURE, ROUTINE: NORMAL

## 2020-10-23 ENCOUNTER — OFFICE VISIT (OUTPATIENT)
Dept: PRIMARY CARE CLINIC | Age: 65
End: 2020-10-23
Payer: COMMERCIAL

## 2020-10-23 PROCEDURE — 99211 OFF/OP EST MAY X REQ PHY/QHP: CPT | Performed by: NURSE PRACTITIONER

## 2020-10-23 PROCEDURE — G8428 CUR MEDS NOT DOCUMENT: HCPCS | Performed by: NURSE PRACTITIONER

## 2020-10-23 PROCEDURE — G8420 CALC BMI NORM PARAMETERS: HCPCS | Performed by: NURSE PRACTITIONER

## 2020-10-23 NOTE — PATIENT INSTRUCTIONS

## 2020-10-26 LAB — SARS-COV-2, NAA: NOT DETECTED

## 2020-10-26 NOTE — RESULT ENCOUNTER NOTE

## 2020-11-24 RX ORDER — ATORVASTATIN CALCIUM 10 MG/1
TABLET, FILM COATED ORAL
Qty: 90 TABLET | Refills: 0 | Status: SHIPPED | OUTPATIENT
Start: 2020-11-24 | End: 2021-02-22

## 2021-02-19 DIAGNOSIS — E78.00 PURE HYPERCHOLESTEROLEMIA: ICD-10-CM

## 2021-02-22 ENCOUNTER — OFFICE VISIT (OUTPATIENT)
Dept: PRIMARY CARE CLINIC | Age: 66
End: 2021-02-22
Payer: MEDICARE

## 2021-02-22 VITALS
HEIGHT: 71 IN | TEMPERATURE: 97.2 F | BODY MASS INDEX: 24.22 KG/M2 | SYSTOLIC BLOOD PRESSURE: 142 MMHG | RESPIRATION RATE: 16 BRPM | WEIGHT: 173 LBS | DIASTOLIC BLOOD PRESSURE: 86 MMHG | HEART RATE: 83 BPM | OXYGEN SATURATION: 100 %

## 2021-02-22 DIAGNOSIS — R06.02 SHORTNESS OF BREATH: ICD-10-CM

## 2021-02-22 DIAGNOSIS — Z00.00 WELL ADULT EXAM: Primary | ICD-10-CM

## 2021-02-22 DIAGNOSIS — R06.83 SNORING: ICD-10-CM

## 2021-02-22 PROCEDURE — 4040F PNEUMOC VAC/ADMIN/RCVD: CPT | Performed by: INTERNAL MEDICINE

## 2021-02-22 PROCEDURE — 3017F COLORECTAL CA SCREEN DOC REV: CPT | Performed by: INTERNAL MEDICINE

## 2021-02-22 PROCEDURE — 1036F TOBACCO NON-USER: CPT | Performed by: INTERNAL MEDICINE

## 2021-02-22 PROCEDURE — 99213 OFFICE O/P EST LOW 20 MIN: CPT | Performed by: INTERNAL MEDICINE

## 2021-02-22 PROCEDURE — 1123F ACP DISCUSS/DSCN MKR DOCD: CPT | Performed by: INTERNAL MEDICINE

## 2021-02-22 PROCEDURE — G8484 FLU IMMUNIZE NO ADMIN: HCPCS | Performed by: INTERNAL MEDICINE

## 2021-02-22 PROCEDURE — G8420 CALC BMI NORM PARAMETERS: HCPCS | Performed by: INTERNAL MEDICINE

## 2021-02-22 PROCEDURE — G8427 DOCREV CUR MEDS BY ELIG CLIN: HCPCS | Performed by: INTERNAL MEDICINE

## 2021-02-22 RX ORDER — ATORVASTATIN CALCIUM 10 MG/1
TABLET, FILM COATED ORAL
Qty: 90 TABLET | Refills: 0 | Status: SHIPPED | OUTPATIENT
Start: 2021-02-22 | End: 2021-05-03

## 2021-02-22 SDOH — ECONOMIC STABILITY: FOOD INSECURITY: WITHIN THE PAST 12 MONTHS, THE FOOD YOU BOUGHT JUST DIDN'T LAST AND YOU DIDN'T HAVE MONEY TO GET MORE.: NEVER TRUE

## 2021-02-22 ASSESSMENT — ENCOUNTER SYMPTOMS
COUGH: 0
BACK PAIN: 0
SHORTNESS OF BREATH: 0
ABDOMINAL DISTENTION: 0
DIARRHEA: 0
ABDOMINAL PAIN: 0
NAUSEA: 0
CHEST TIGHTNESS: 0

## 2021-02-22 ASSESSMENT — PATIENT HEALTH QUESTIONNAIRE - PHQ9
SUM OF ALL RESPONSES TO PHQ QUESTIONS 1-9: 0
2. FEELING DOWN, DEPRESSED OR HOPELESS: 0
1. LITTLE INTEREST OR PLEASURE IN DOING THINGS: 0

## 2021-02-22 NOTE — TELEPHONE ENCOUNTER
Requested Prescriptions     Pending Prescriptions Disp Refills    atorvastatin (LIPITOR) 10 MG tablet [Pharmacy Med Name: Atorvastatin Calcium 10 MG Oral Tablet] 90 tablet 0     Sig: Take 1 tablet by mouth once daily      Last OV 6/19/2020

## 2021-02-22 NOTE — PROGRESS NOTES
2/22/2021   Isaac Santoyo  1955    The patients PMH, surgical history, family history, medications, allergies were all reviewed and updated as appropriate today. Current Outpatient Medications on File Prior to Visit   Medication Sig Dispense Refill    atorvastatin (LIPITOR) 10 MG tablet Take 1 tablet by mouth once daily 90 tablet 0    Omega-3 Fatty Acids (FISH OIL) 1200 MG CAPS Take 1 capsule by mouth daily      aspirin 81 MG chewable tablet Take 81 mg by mouth daily.  therapeutic multivitamin-minerals (THERAGRAN-M) tablet Take 1 tablet by mouth daily.  Calcium Carb-Cholecalciferol (CALCIUM PLUS VITAMIN D3) 600-500 MG-UNIT CAPS Take 1 tablet by mouth daily.  Saw Palmetto, Serenoa repens, 450 MG CAPS Take 1 capsule by mouth daily. No current facility-administered medications on file prior to visit. Chief Complaint   Patient presents with   [address]     f/u. HPI:   TSH and testosterone results here today  Not taking any testosterone injections or supplements    Saw Dr. Ahmet Oates 11/2020, Flomax didn't help  Wants a sleep apnea test done since urology told him that sleep apnea can cause patients to get up at night     I also want an EKG done I HAVENT HAD ANYTHING Doreatha Barefoot Dr. Des Hoff told me I could take them  LET ME THROW THIS AT YOU I NEVE HAD CHEST PAIN BUT I MOVED 2 STORAGE UNITS WITH NO PROBLEMS BUT WHEN I UNLOADED I GOT SHORT OF BREATH 6 months ago   I never got to have a stress test done for years    Review of Systems   Constitutional: Negative for appetite change, chills, fatigue and fever. Respiratory: Negative for cough, chest tightness and shortness of breath. Cardiovascular: Negative for chest pain. Gastrointestinal: Negative for abdominal distention, abdominal pain, diarrhea and nausea. Genitourinary: Negative for difficulty urinating and dysuria. Head, normocephalic, atraumatic, Face, Face within normal limits, Ears, External ears within normal limits, Nose/Nasopharynx, External nose  normal appearance, nares patent, no nasal discharge, Mouth and Throat, Oral cavity appearance normal, Breath odor normal, Lips, Appearance normal Musculoskeletal: Negative for back pain. Neurological: Negative for dizziness and headaches. Psychiatric/Behavioral: Negative for agitation and behavioral problems. The patient is not nervous/anxious. OBJECTIVE:  BP (!) 142/86 (Site: Right Upper Arm)   Pulse 83   Temp 97.2 °F (36.2 °C) (Temporal)   Resp 16   Ht 5' 11\" (1.803 m)   Wt 173 lb (78.5 kg)   SpO2 100%   BMI 24.13 kg/m²     Physical Exam  Vitals signs and nursing note reviewed. Constitutional:       General: He is not in acute distress. Appearance: Normal appearance. He is well-developed. HENT:      Head: Normocephalic and atraumatic. Right Ear: Tympanic membrane, ear canal and external ear normal.      Left Ear: Tympanic membrane, ear canal and external ear normal.      Nose: Nose normal. No rhinorrhea. Mouth/Throat:      Pharynx: No oropharyngeal exudate or posterior oropharyngeal erythema. Eyes:      General:         Right eye: No discharge. Left eye: No discharge. Extraocular Movements: Extraocular movements intact. Conjunctiva/sclera: Conjunctivae normal.      Pupils: Pupils are equal, round, and reactive to light. Neck:      Musculoskeletal: Normal range of motion. No muscular tenderness. Thyroid: No thyromegaly. Vascular: No carotid bruit or JVD. Cardiovascular:      Rate and Rhythm: Normal rate and regular rhythm. Pulses: Normal pulses. Heart sounds: Normal heart sounds. No murmur. Pulmonary:      Effort: Pulmonary effort is normal. No respiratory distress. Breath sounds: Normal breath sounds. No wheezing, rhonchi or rales. Abdominal:      General: Bowel sounds are normal. There is no distension. Palpations: Abdomen is soft. Tenderness: There is no abdominal tenderness. There is no rebound. Musculoskeletal:         General: No swelling. Right lower leg: No edema. Left lower leg: No edema.       Comments: FROM x 4   Lymphadenopathy: Cervical: No cervical adenopathy. Skin:     General: Skin is warm and dry. Capillary Refill: Capillary refill takes 2 to 3 seconds. Coloration: Skin is not pale. Findings: No erythema or rash. Neurological:      Mental Status: He is alert and oriented to person, place, and time. Cranial Nerves: No cranial nerve deficit. Sensory: No sensory deficit. Motor: No abnormal muscle tone. Deep Tendon Reflexes: Reflexes normal.   Psychiatric:         Mood and Affect: Mood normal.         Behavior: Behavior normal.         Thought Content:  Thought content normal.         Judgment: Judgment normal.       Wt Readings from Last 3 Encounters:   02/22/21 173 lb (78.5 kg)   06/19/20 167 lb (75.8 kg)   11/04/19 172 lb (78 kg)       Data Review:   CBC:   Lab Results   Component Value Date    WBC 6.1 06/12/2020    WBC 4.4 11/04/2019    WBC 4.0 09/20/2019    HGB 14.4 06/12/2020    HGB 13.7 11/04/2019    HGB 14.9 09/20/2019    HCT 44.1 06/12/2020    HCT 41.4 11/04/2019    HCT 44.2 09/20/2019    MCV 94 06/12/2020    MCV 91 11/04/2019    MCV 91 09/20/2019     06/12/2020     11/04/2019     09/20/2019     Chemistry:   Lab Results   Component Value Date     06/12/2020     11/04/2019     09/20/2019    K 5.0 06/12/2020    K 4.4 11/04/2019    K 4.5 09/20/2019     06/12/2020     11/04/2019     09/20/2019    CO2 29 06/12/2020    CO2 26 11/04/2019    CO2 28 09/20/2019    BUN 22 06/12/2020    BUN 20 12/13/2019    BUN 13 11/04/2019    CREATININE 1.11 06/12/2020    CREATININE 1.0 12/13/2019    CREATININE 1.01 11/04/2019     Hepatic Function:   Lab Results   Component Value Date    AST 27 06/12/2020    AST 33 11/04/2019    AST 34 09/20/2019    ALT 23 06/12/2020    ALT 26 11/04/2019    ALT 23 09/20/2019    BILITOT 1.2 06/12/2020    BILITOT 0.5 11/04/2019    BILITOT 0.9 09/20/2019    ALKPHOS 57 06/12/2020    ALKPHOS 54 11/04/2019    ALKPHOS 52 09/20/2019 Lab Results   Component Value Date    LIPASE 40 12/14/2015    AMYLASE 63 11/04/2019    AMYLASE 87 12/14/2015     Lipids:   Lab Results   Component Value Date    CHOL 139 06/12/2020    HDL 64 06/12/2020    TRIG 50 06/12/2020       ASSESSMENT/PLAN  1.) fatigue- pt concerned that testosterone level wasn't low  Repeat fasting lipids again in 6 months   I WANT ALL MY CHOLESTEROL TESTS SONE IN June I WANT A FREE MEDICARE WELLNESS VISIT then  Sleep study ordered as per pt request     f/u in 6 months for AMW visit    2.) Fam hx ASCVD-Wants a stress test done since he got SOB 6 months ago when moving  [facility]      plan AMW visit at next visit this summer    Unknown Necessary        Electronically signed by Moody Villafuerte MD on 2/22/2021 at 11:00 AM

## 2021-03-22 ENCOUNTER — VIRTUAL VISIT (OUTPATIENT)
Dept: PULMONOLOGY | Age: 66
End: 2021-03-22
Payer: MEDICARE

## 2021-03-22 DIAGNOSIS — G47.10 HYPERSOMNIA: Primary | ICD-10-CM

## 2021-03-22 DIAGNOSIS — K21.9 GASTROESOPHAGEAL REFLUX DISEASE WITHOUT ESOPHAGITIS: Chronic | ICD-10-CM

## 2021-03-22 DIAGNOSIS — R06.83 SNORING: ICD-10-CM

## 2021-03-22 PROCEDURE — 4040F PNEUMOC VAC/ADMIN/RCVD: CPT | Performed by: INTERNAL MEDICINE

## 2021-03-22 PROCEDURE — 99204 OFFICE O/P NEW MOD 45 MIN: CPT | Performed by: INTERNAL MEDICINE

## 2021-03-22 PROCEDURE — 3017F COLORECTAL CA SCREEN DOC REV: CPT | Performed by: INTERNAL MEDICINE

## 2021-03-22 PROCEDURE — 1123F ACP DISCUSS/DSCN MKR DOCD: CPT | Performed by: INTERNAL MEDICINE

## 2021-03-22 PROCEDURE — G8427 DOCREV CUR MEDS BY ELIG CLIN: HCPCS | Performed by: INTERNAL MEDICINE

## 2021-03-22 RX ORDER — PANTOPRAZOLE SODIUM 40 MG/1
TABLET, DELAYED RELEASE ORAL
COMMUNITY
Start: 2020-12-28

## 2021-03-22 ASSESSMENT — SLEEP AND FATIGUE QUESTIONNAIRES
ESS TOTAL SCORE: 6
HOW LIKELY ARE YOU TO NOD OFF OR FALL ASLEEP WHILE WATCHING TV: 1
HOW LIKELY ARE YOU TO NOD OFF OR FALL ASLEEP WHILE SITTING INACTIVE IN A PUBLIC PLACE: 1
HOW LIKELY ARE YOU TO NOD OFF OR FALL ASLEEP WHILE SITTING AND READING: 1

## 2021-03-22 NOTE — PROGRESS NOTES
Prachi Mcdonald MD, Elsa Vieyra, [facility]  Laura Kehrt CNP  Nava Sofia CNP Angella Chopraa [address]  Anthony Lowe [address] [facility], [address] [place] [phone]   [facility] [address]  Anthony Lowe. [address] [place] [address]. Seymour Sawant [address] [phone]     Video Visit- Consult    Pursuant to the emergency declaration under the [address] [place] [address], [facility] waiver authority and the [employer] Act, this Virtual  Visit was conducted, with patient's consent, to reduce the patient's risk of exposure to COVID-19. Services were provided through a video synchronous discussion virtually to substitute for in-person clinic visit. Patient was located in their home. Assessment:      Visit Diagnoses and Associated Orders     Hypersomnia   (New Problem)  -  Primary    needs work-up    POLYSOMNOGRAPHY (PSG) - Diagnostic Testing [30007 Custom]   - Future Order         Snoring   (New Problem)      needs work-up    POLYSOMNOGRAPHY (PSG) - Diagnostic Testing [65023 Custom]   - Future Order         Gastroesophageal reflux disease without esophagitis   (Stable)      pantoprazole (PROTONIX) 40 MG tablet [30365]                  Plan:      One or more undiagnosed new problem with uncertain prognosis till final diagnosis is made. Differential diagnosis includes but not limited to: DONTA, PLMD's, narcolepsy, parasomnias. Reviewed DONTA (which is the highest likelihood diagnosis): pathophysiology, diagnosis, complications and treatment. Instructed him not to drive if drowsy. Continue medications per his PCP and other physicians. Limit caffeine use after 3pm. Will do PSG to rule-out DONTA and other sleep disorders. 1 wk follow up after study to review his results. The chronic medical conditions listed are directly related to the primary diagnosis listed above. The management of the primary diagnosis affects the secondary diagnosis and vice versa.     Reviewed referral note for Inge Lr MD (PCP) showing patient requesting sleep testing. This information was analyzed to assess complexity and medical decision making in regards to further testing and management. Reviewed external note from Urology, Dr. Sara Tan on 11/6/20 which shows recommendation for sleep testing. Labs that can affect excessive daytime sleepiness:  Reviewed labs from 2/3/21:  TSH and testosterone - both labs are normal    Continue meds for: GERD. Pt would medically benefit from wt loss for DONTA (diet, exercise, surgical). Orders Placed This Encounter   Procedures    POLYSOMNOGRAPHY (PSG) - Diagnostic Testing          Subjective:     Patient ID: Linda Tilley is a 72 y.o. male. Chief Complaint   Patient presents with    Sleep Apnea       HPI:      Linda Tilley is a 72 y.o. male referred by Mikey Dodge MD for a sleep evaluation. He complains of: snoring, excessive daytime sleepiness , non-restorative sleep, nocturia, napping and tossing and turning at night. He denies: cataplexy and hypnagogic hallucinations. Symptoms have been there for 20 years and getting worse. Previous evaluation and treatment has included- None / Independent interpretation of the test (sleep study) by another, external physician shows:     DOT/CDL - No  FAA/'s license -No    Previous Report(s) Reviewed: historical medical records, office notes, and referral letter(s). Pertinent data has been documented.     [place] - Total score: 6    Caffeine Intake - Coffee: 1 cup(s) per day    Social History     Socioeconomic History    Marital status:      Spouse name: Daniel Gonzalez Number of children: 2    Years of education: Not on file    Highest education level: Not on file   Occupational History    Occupation:      Comment: [address] resource strain: Not hard at all   Todd-Kristi insecurity     Worry: Never true     Inability: Never true   [employer] needs     Medical: No Non-medical: No   Tobacco Use    Smoking status: Never Smoker    Smokeless tobacco: Never Used    Tobacco comment: never   Substance and Sexual Activity    Alcohol use: No     Comment: none    Drug use: No     Comment: none    Sexual activity: Never     Partners: Female   Lifestyle    Physical activity     Days per week: Not on file     Minutes per session: Not on file    Stress: Not on file   Relationships    Social connections     Talks on phone: Not on file     Gets together: Not on file     Attends Congregational service: Not on file     Active member of club or organization: Not on file     Attends meetings of clubs or organizations: Not on file     Relationship status: Not on file    Intimate partner violence     Fear of current or ex partner: Not on file     Emotionally abused: Not on file     Physically abused: Not on file     Forced sexual activity: Not on file   Other Topics Concern    Not on file   Social History Narrative    Not on file        Current Outpatient Medications   Medication Sig Dispense Refill    atorvastatin (LIPITOR) 10 MG tablet Take 1 tablet by mouth once daily 90 tablet 0    Omega-3 Fatty Acids (FISH OIL) 1200 MG CAPS Take 1 capsule by mouth daily      aspirin 81 MG chewable tablet Take 81 mg by mouth daily.  therapeutic multivitamin-minerals (THERAGRAN-M) tablet Take 1 tablet by mouth daily.  Calcium Carb-Cholecalciferol (CALCIUM PLUS VITAMIN D3) 600-500 MG-UNIT CAPS Take 1 tablet by mouth daily.  Saw Palmetto, Serenoa repens, 450 MG CAPS Take 1 capsule by mouth daily.  pantoprazole (PROTONIX) 40 MG tablet TAKE 1 TABLET BY MOUTH ONCE DAILY       No current facility-administered medications for this visit.         Allergies as of 03/22/2021    (No Known Allergies)       Patient Active Problem List   Diagnosis    Pure hypercholesterolemia    Palpitations    GERD (gastroesophageal reflux disease)    Essential tremor       Past Medical History: Diagnosis Date    GERD (gastroesophageal reflux disease) 10/5/2012    Hyperlipidemia     Pure hypercholesterolemia 10/4/2012       Family History   Problem Relation Age of Onset    Depression Mother     Vision Loss Mother     Diabetes Father     Hearing Loss Father     Heart Disease Father     High Cholesterol Father     Vision Loss Father     Cancer Paternal Uncle     Stroke Maternal Grandfather     Mult Sclerosis Sister     Sleep Apnea Son        Objective:     Vitals:  Patient reported Height and Weight Calculated BMI   Patient-Reported Vitals 3/22/2021   Patient-Reported Weight 170#   Patient-Reported Height 5'11'       23.7     Due to COVID-19 this was a virtual visit and physical exam was deferred.     Electronically signed by Nida Sharp MD on3/22/2021 at 2:32 PM

## 2021-03-22 NOTE — LETTER
[facility] Sleep Medicine  [id number] [facility]  [address]ima [address] [address]  Phone: [phone]  Fax: [phone]      March 22, 2021       Patient: Rosa Maria Chappell   MR Number: [id number]   YOB: 1955   Date of Visit: 3/22/2021     Thank you for allowing me to participate in the care of Gill Bonilla. Here is my assessment and plan. Also attached is a copy of his consult note:    ASSESSMENT:  Visit Diagnoses and Associated Orders     Hypersomnia   (New Problem)  -  Primary    needs work-up    POLYSOMNOGRAPHY (PSG) - Diagnostic Testing [47988 Custom]   - Future Order         Snoring   (New Problem)      needs work-up    POLYSOMNOGRAPHY (PSG) - Diagnostic Testing [97107 Custom]   - Future Order         Gastroesophageal reflux disease without esophagitis   (Stable)      pantoprazole (PROTONIX) 40 MG tablet [76120]                 Plan:  One or more undiagnosed new problem with uncertain prognosis till final diagnosis is made. Differential diagnosis includes but not limited to: DONTA, PLMD's, narcolepsy, parasomnias. Reviewed DONTA (which is the highest likelihood diagnosis): pathophysiology, diagnosis, complications and treatment. Instructed him not to drive if drowsy. Continue medications per his PCP and other physicians. Limit caffeine use after 3pm. Will do PSG to rule-out DONTA and other sleep disorders. 1 wk follow up after study to review his results. The chronic medical conditions listed are directly related to the primary diagnosis listed above. The management of the primary diagnosis affects the secondary diagnosis and vice versa. Reviewed referral note for Andrea Stuart MD (PCP) showing patient requesting sleep testing. This information was analyzed to assess complexity and medical decision making in regards to further testing and management. Reviewed external note from Urology, Dr. Harvey Lowe on 11/6/20 which shows recommendation for sleep testing.      Labs that can affect excessive daytime sleepiness:  Reviewed labs from 2/3/21:  TSH and testosterone - both labs are normal    Continue meds for: GERD. Pt would medically benefit from wt loss for DONTA (diet, exercise, surgical). Orders Placed This Encounter   Procedures    POLYSOMNOGRAPHY (PSG) - Diagnostic Testing         If you have questions or concerns, please do not hesitate to call me. I look forward to following Wayne Kyle along with you.     Sincerely,      Omi Camara MD    CC providers:  Apryl Lin MD  [address] [facility] [address]  Via In Basket     MD Javi Posey [place] [id number]  Via In [employer]

## 2021-03-24 ENCOUNTER — HOSPITAL ENCOUNTER (OUTPATIENT)
Dept: NON INVASIVE DIAGNOSTICS | Age: 66
Discharge: HOME | End: 2021-03-24
Payer: MEDICARE

## 2021-03-24 ENCOUNTER — HOSPITAL ENCOUNTER (OUTPATIENT)
Dept: NUCLEAR MEDICINE | Age: 66
Discharge: HOME | End: 2021-03-24
Payer: MEDICARE

## 2021-03-24 ENCOUNTER — TELEPHONE (OUTPATIENT)
Dept: SLEEP CENTER | Age: 66
End: 2021-03-24

## 2021-03-24 DIAGNOSIS — R06.02 SHORTNESS OF BREATH: ICD-10-CM

## 2021-03-24 LAB
LV EF: 67 %
LVEF MODALITY: NORMAL

## 2021-03-24 PROCEDURE — A9502 TC99M TETROFOSMIN: HCPCS | Performed by: INTERNAL MEDICINE

## 2021-03-24 PROCEDURE — 93017 CV STRESS TEST TRACING ONLY: CPT

## 2021-03-24 PROCEDURE — 3430000000 HC RX DIAGNOSTIC RADIOPHARMACEUTICAL: Performed by: INTERNAL MEDICINE

## 2021-03-24 PROCEDURE — 78452 HT MUSCLE IMAGE SPECT MULT: CPT

## 2021-03-24 RX ADMIN — TETROFOSMIN 11.2 MILLICURIE: 1.38 INJECTION, POWDER, LYOPHILIZED, FOR SOLUTION INTRAVENOUS at 09:05

## 2021-03-24 RX ADMIN — TETROFOSMIN 32.7 MILLICURIE: 1.38 INJECTION, POWDER, LYOPHILIZED, FOR SOLUTION INTRAVENOUS at 10:38

## 2021-03-24 NOTE — TELEPHONE ENCOUNTER
Called pt to schedule psg per Patricia Shaffer. Left vm for the pt to return my call.      Cms is primary   Not Med mutual - per Ankit's notes

## 2021-04-01 ENCOUNTER — OFFICE VISIT (OUTPATIENT)
Dept: PRIMARY CARE CLINIC | Age: 66
End: 2021-04-01
Payer: MEDICARE

## 2021-04-01 DIAGNOSIS — Z01.818 PREOP EXAMINATION: Primary | ICD-10-CM

## 2021-04-01 LAB — SARS-COV-2: NOT DETECTED

## 2021-04-01 PROCEDURE — 99211 OFF/OP EST MAY X REQ PHY/QHP: CPT | Performed by: NURSE PRACTITIONER

## 2021-04-01 PROCEDURE — G8428 CUR MEDS NOT DOCUMENT: HCPCS | Performed by: NURSE PRACTITIONER

## 2021-04-01 PROCEDURE — G8420 CALC BMI NORM PARAMETERS: HCPCS | Performed by: NURSE PRACTITIONER

## 2021-04-06 ENCOUNTER — HOSPITAL ENCOUNTER (OUTPATIENT)
Dept: SLEEP CENTER | Age: 66
Discharge: HOME | End: 2021-04-06
Payer: MEDICARE

## 2021-04-06 DIAGNOSIS — R06.83 SNORING: ICD-10-CM

## 2021-04-06 DIAGNOSIS — G47.10 HYPERSOMNIA: ICD-10-CM

## 2021-04-06 PROCEDURE — 95810 POLYSOM 6/> YRS 4/> PARAM: CPT

## 2021-04-06 PROCEDURE — 95810 POLYSOM 6/> YRS 4/> PARAM: CPT | Performed by: INTERNAL MEDICINE

## 2021-04-08 ENCOUNTER — TELEPHONE (OUTPATIENT)
Dept: PULMONOLOGY | Age: 66
End: 2021-04-08

## 2021-04-08 NOTE — TELEPHONE ENCOUNTER
Spoke with pt to review PSG results. Pt to schedule a titration study. Kevon Nation from sleep lab notified.

## 2021-04-09 ENCOUNTER — TELEPHONE (OUTPATIENT)
Dept: SLEEP CENTER | Age: 66
End: 2021-04-09

## 2021-04-09 ENCOUNTER — TELEPHONE (OUTPATIENT)
Dept: PULMONOLOGY | Age: 66
End: 2021-04-09

## 2021-04-09 NOTE — TELEPHONE ENCOUNTER
Spoke with pt on 04/08/2021 to review PSG results. Nigel Campo from sleep lab notified to contact pt for a titration study. Pt calling today trying to put his sleep apnea in perspective after friends told him their events were in the nineties. Reviewed results again with pt and explained sleep apnea further . Pt to contact Nigel Campo to schedule a titration study.

## 2021-04-09 NOTE — TELEPHONE ENCOUNTER
Called to schedule cpap study per Shanita Jean. Pt was uncertain of the direction he wanted to go after hearing results. Was maybe hoping to speak with  first.     I transferred him over to Pham's line and we'll go from there.

## 2021-04-15 ENCOUNTER — OFFICE VISIT (OUTPATIENT)
Dept: PRIMARY CARE CLINIC | Age: 66
End: 2021-04-15
Payer: MEDICARE

## 2021-04-15 DIAGNOSIS — Z01.818 PREOP EXAMINATION: Primary | ICD-10-CM

## 2021-04-15 PROCEDURE — G8420 CALC BMI NORM PARAMETERS: HCPCS | Performed by: NURSE PRACTITIONER

## 2021-04-15 PROCEDURE — 99211 OFF/OP EST MAY X REQ PHY/QHP: CPT | Performed by: NURSE PRACTITIONER

## 2021-04-15 PROCEDURE — G8428 CUR MEDS NOT DOCUMENT: HCPCS | Performed by: NURSE PRACTITIONER

## 2021-04-16 LAB — SARS-COV-2: NOT DETECTED

## 2021-04-19 ENCOUNTER — HOSPITAL ENCOUNTER (OUTPATIENT)
Dept: SLEEP CENTER | Age: 66
Discharge: HOME | End: 2021-04-19
Payer: MEDICARE

## 2021-04-19 DIAGNOSIS — G47.33 OBSTRUCTIVE SLEEP APNEA (ADULT) (PEDIATRIC): ICD-10-CM

## 2021-04-19 DIAGNOSIS — G47.33 OBSTRUCTIVE SLEEP APNEA (ADULT) (PEDIATRIC): Primary | ICD-10-CM

## 2021-04-19 PROCEDURE — 95811 POLYSOM 6/>YRS CPAP 4/> PARM: CPT | Performed by: INTERNAL MEDICINE

## 2021-04-19 PROCEDURE — 95811 POLYSOM 6/>YRS CPAP 4/> PARM: CPT

## 2021-04-26 ENCOUNTER — TELEPHONE (OUTPATIENT)
Dept: PULMONOLOGY | Age: 66
End: 2021-04-26

## 2021-04-26 NOTE — PROGRESS NOTES
Gina Martinez         : 1955 [facility]    Diagnosis: [x] DONTA (G47.33) [] CSA (G47.31) [] Apnea (G47.30)   Length of Need: [x] 12 Months [] 99 Months [] Other:    Machine (MATIAS!): [x] Respironics Dream Station   2   Auto [] ResMed AirSense     Auto [] Other:     [x]  CPAP () [] Bilevel ()   Mode: [x] Auto [] Spontaneous    Mode: [] Auto [] Spontaneous          P min 8 cmH2O  P max 18 cmH2O                 Comfort Settings:   - Ramp Pressure: 4 cmH2O                                        - Ramp time: 15 min                                     -  Flex/EPR - 3 full time                                    - For ResMed Bilevel (TiMax-4 sec   TiMin- 0.2 sec)     Humidifier: [x] Heated ()        [x] Water chamber replacement ()/ 1 per 6 months        Mask:   [] Nasal () /1 per 3 months [x] Full Face () /1 per 3 months   [] Patient choice -Size and fit mask [x] Patient Choice - Size and fit mask   [] Dispense:  [] Dispense:    [] Headgear () / 1 per 3 months [x] Headgear () / 1 per 3 months   [] Replacement Nasal Cushion ()/2 per month [x] Interface Replacement ()/1 per month   [] Replacement Nasal Pillows ()/2 per month         Tubing: [x] Heated ()/1 per 3 months    [] Standard ()/1 per 3 months [] Other:           Filters: [x] Non-disposable ()/1 per 6 months     [x] Ultra-Fine, Disposable ()/2 per month        Miscellaneous: [] Chin Strap ()/ 1 per 6 months [] O2 bleed-in:       LPM   [] Oximetry on CPAP/Bilevel []  Other:    [x] Modem: ()         Start Order Date: 21    MEDICAL JUSTIFICATION:  I, the undersigned, certify that the above prescribed supplies are medically necessary for this patients wellbeing. In my opinion, the supplies are both reasonable and necessary in reference to accepted standards of medicalpractice in treatment of this patients condition.     Aleyda Walker MD      NPI: [id number]       Order Signed Date: 21    Electronically signed by Cristofer Gill MD on 2021 at 9:51 AM    Keron Wayne  1955  [id number] [facility] [address] [facility]  [phone] (home) [phone] (work)  [phone] (mobile)      Insurance Info (confirm with patient if correct):  Payor/Plan Subscr  Sex Relation Sub.  Ins. ID Effective Group Num

## 2021-05-03 DIAGNOSIS — E78.00 PURE HYPERCHOLESTEROLEMIA: ICD-10-CM

## 2021-05-03 RX ORDER — ATORVASTATIN CALCIUM 10 MG/1
TABLET, FILM COATED ORAL
Qty: 90 TABLET | Refills: 0 | Status: SHIPPED | OUTPATIENT
Start: 2021-05-03 | End: 2021-07-06 | Stop reason: SDUPTHER

## 2021-05-03 NOTE — TELEPHONE ENCOUNTER
Requested Prescriptions     Pending Prescriptions Disp Refills    atorvastatin (LIPITOR) 10 MG tablet [Pharmacy Med Name: Atorvastatin Calcium 10 MG Oral Tablet] 90 tablet 0     Sig: Take 1 tablet by mouth once daily       Lastov 2/22/2021

## 2021-07-01 ENCOUNTER — VIRTUAL VISIT (OUTPATIENT)
Dept: PULMONOLOGY | Age: 66
End: 2021-07-01
Payer: MEDICARE

## 2021-07-01 DIAGNOSIS — G47.33 OBSTRUCTIVE SLEEP APNEA SYNDROME: Primary | ICD-10-CM

## 2021-07-01 DIAGNOSIS — K21.9 GASTROESOPHAGEAL REFLUX DISEASE WITHOUT ESOPHAGITIS: Chronic | ICD-10-CM

## 2021-07-01 PROCEDURE — 3017F COLORECTAL CA SCREEN DOC REV: CPT | Performed by: INTERNAL MEDICINE

## 2021-07-01 PROCEDURE — G8427 DOCREV CUR MEDS BY ELIG CLIN: HCPCS | Performed by: INTERNAL MEDICINE

## 2021-07-01 PROCEDURE — 1123F ACP DISCUSS/DSCN MKR DOCD: CPT | Performed by: INTERNAL MEDICINE

## 2021-07-01 PROCEDURE — 99214 OFFICE O/P EST MOD 30 MIN: CPT | Performed by: INTERNAL MEDICINE

## 2021-07-01 PROCEDURE — 4040F PNEUMOC VAC/ADMIN/RCVD: CPT | Performed by: INTERNAL MEDICINE

## 2021-07-01 ASSESSMENT — SLEEP AND FATIGUE QUESTIONNAIRES
HOW LIKELY ARE YOU TO NOD OFF OR FALL ASLEEP IN A CAR, WHILE STOPPED FOR A FEW MINUTES IN TRAFFIC: 0
HOW LIKELY ARE YOU TO NOD OFF OR FALL ASLEEP WHILE SITTING QUIETLY AFTER LUNCH WITHOUT ALCOHOL: 1
HOW LIKELY ARE YOU TO NOD OFF OR FALL ASLEEP WHILE SITTING AND READING: 1
HOW LIKELY ARE YOU TO NOD OFF OR FALL ASLEEP WHILE WATCHING TV: 1
HOW LIKELY ARE YOU TO NOD OFF OR FALL ASLEEP WHEN YOU ARE A PASSENGER IN A CAR FOR AN HOUR WITHOUT A BREAK: 1
HOW LIKELY ARE YOU TO NOD OFF OR FALL ASLEEP WHILE LYING DOWN TO REST IN THE AFTERNOON WHEN CIRCUMSTANCES PERMIT: 2
ESS TOTAL SCORE: 8
HOW LIKELY ARE YOU TO NOD OFF OR FALL ASLEEP WHILE SITTING INACTIVE IN A PUBLIC PLACE: 1
HOW LIKELY ARE YOU TO NOD OFF OR FALL ASLEEP WHILE SITTING AND TALKING TO SOMEONE: 1

## 2021-07-01 ASSESSMENT — LIFESTYLE VARIABLES
AUDIT-C TOTAL SCORE: INCOMPLETE
HOW OFTEN DO YOU HAVE A DRINK CONTAINING ALCOHOL: 0
AUDIT TOTAL SCORE: INCOMPLETE
HOW OFTEN DO YOU HAVE A DRINK CONTAINING ALCOHOL: NEVER

## 2021-07-01 ASSESSMENT — PATIENT HEALTH QUESTIONNAIRE - PHQ9
SUM OF ALL RESPONSES TO PHQ9 QUESTIONS 1 & 2: 0
2. FEELING DOWN, DEPRESSED OR HOPELESS: 0
SUM OF ALL RESPONSES TO PHQ QUESTIONS 1-9: 0
1. LITTLE INTEREST OR PLEASURE IN DOING THINGS: 0
SUM OF ALL RESPONSES TO PHQ QUESTIONS 1-9: 0
SUM OF ALL RESPONSES TO PHQ QUESTIONS 1-9: 0

## 2021-07-01 NOTE — ASSESSMENT & PLAN NOTE
New Problem - On Tx. Reviewed sleep study and download compliance data with patient. Supplies and parts as needed for his machine. These are medically necessary. Limit caffeine use after 3pm.  Will trial pressure increase to see if helps with dryness, Pmin-11. If not better may need bilevel.

## 2021-07-01 NOTE — PROGRESS NOTES
diagnosis and vice versa. Subjective:     Patient ID: Giselle Rios is a 72 y.o. male. Chief Complaint   Patient presents with    Sleep Apnea     Subjective   HPI:    Machine Modem/Download Info:  Compliance (hours/night): 4.75 hrs/night  % of nights >= 4 hrs: 86.9 %  Download AHI (/hour): 6.4 /HR  Average CPAP Pressure : 10.5 cmH2O     APAP - Settings  Pressure Min: 8 cmH2O  Pressure Max: 18 cmH2O                 Comfort Settings  Humidity Level (0-8): 5  Flex/EPR (0-3): 3       Had polysomnography on 4/6/21 which showed CMS AHI-19.1/hr but RDI-53.7/hr with low sat-86% and time below 90% of 3.2 min. Had titration done on 4/21/21    Feels he is doing better with his machine but having a lot of dryness especially to his mouth. His teeth grinding has gotten better and he is sleeping well for the most part. He is going in for blood pressure check in hopes that we will reduce as well with CPAP. No issues with aerophagia or headaches.     [facility]    [place] - Total score: 8    Current Outpatient Medications   Medication Instructions    aspirin 81 mg, DAILY    atorvastatin (LIPITOR) 10 MG tablet Take 1 tablet by mouth once daily    Calcium Carb-Cholecalciferol (CALCIUM PLUS VITAMIN D3) 600-500 MG-UNIT CAPS 1 tablet, DAILY    Omega-3 Fatty Acids (FISH OIL) 1200 MG CAPS 1 capsule, Oral, DAILY    pantoprazole (PROTONIX) 40 MG tablet TAKE 1 TABLET BY MOUTH ONCE DAILY    Saw Palmetto, Serenoa repens, 450 MG CAPS 1 capsule, DAILY    therapeutic multivitamin-minerals (THERAGRAN-M) tablet 1 tablet, DAILY        Electronically signed by Tomás Wise MD on 7/1/2021 at 10:58 AM

## 2021-07-01 NOTE — LETTER
[facility]  [place] [address] [place] [address]  Phone: [phone]  Fax: [phone]    Yair Burdick MD    July 1, 2021     Michelet Anna, [address] [place] [address]    Patient: Linda Cast   MR Number: [id number]   YOB: 1955   Date of Visit: 7/1/2021       Dear Michelet Anna:    Thank you for referring Elisha Bain to me for evaluation/treatment. Below are the relevant portions of my assessment and plan of care. 1. Obstructive sleep apnea syndrome  Assessment & Plan:  New Problem - On Tx. Reviewed sleep study and download compliance data with patient. Supplies and parts as needed for his machine. These are medically necessary. Limit caffeine use after 3pm.  Will trial pressure increase to see if helps with dryness, Pmin-11. If not better may need bilevel. 2. Gastroesophageal reflux disease without esophagitis  Assessment & Plan:  Chronic- Stable. Discussed the importance of treating obstructive sleep apnea as part of the management of this disorder. Cont any meds per PCP and other physicians. Reviewed, analyzed, and documented physiologic data from patient's PAP machine. This information was analyzed to assess complexity and medical decision making in regards to further testing and management. The primary encounter diagnosis was Obstructive sleep apnea syndrome. A diagnosis of Gastroesophageal reflux disease without esophagitis was also pertinent to this visit. The chronic medical conditions listed are directly related to the primary diagnosis listed above. The management of the primary diagnosis affects the secondary diagnosis and vice versa. If you have questions, please do not hesitate to call me. I look forward to following [facility] along with you.     Sincerely,        Yair Burdick MD

## 2021-07-06 ENCOUNTER — OFFICE VISIT (OUTPATIENT)
Dept: PRIMARY CARE CLINIC | Age: 66
End: 2021-07-06
Payer: MEDICARE

## 2021-07-06 VITALS
DIASTOLIC BLOOD PRESSURE: 76 MMHG | HEART RATE: 73 BPM | WEIGHT: 172.6 LBS | SYSTOLIC BLOOD PRESSURE: 117 MMHG | HEIGHT: 70 IN | BODY MASS INDEX: 24.71 KG/M2

## 2021-07-06 DIAGNOSIS — E78.00 PURE HYPERCHOLESTEROLEMIA: ICD-10-CM

## 2021-07-06 DIAGNOSIS — Z23 NEED FOR PROPHYLACTIC VACCINATION AND INOCULATION AGAINST VARICELLA: ICD-10-CM

## 2021-07-06 DIAGNOSIS — R97.20 PSA ELEVATION: ICD-10-CM

## 2021-07-06 DIAGNOSIS — Z12.11 COLON CANCER SCREENING: Primary | ICD-10-CM

## 2021-07-06 DIAGNOSIS — Z00.00 ROUTINE GENERAL MEDICAL EXAMINATION AT A HEALTH CARE FACILITY: ICD-10-CM

## 2021-07-06 PROBLEM — E03.4 HYPOTHYROIDISM DUE TO ACQUIRED ATROPHY OF THYROID: Status: ACTIVE | Noted: 2021-07-06

## 2021-07-06 PROBLEM — E03.4 HYPOTHYROIDISM DUE TO ACQUIRED ATROPHY OF THYROID: Status: RESOLVED | Noted: 2021-07-06 | Resolved: 2021-07-06

## 2021-07-06 PROCEDURE — G0438 PPPS, INITIAL VISIT: HCPCS | Performed by: INTERNAL MEDICINE

## 2021-07-06 PROCEDURE — 1123F ACP DISCUSS/DSCN MKR DOCD: CPT | Performed by: INTERNAL MEDICINE

## 2021-07-06 PROCEDURE — 3017F COLORECTAL CA SCREEN DOC REV: CPT | Performed by: INTERNAL MEDICINE

## 2021-07-06 PROCEDURE — 4040F PNEUMOC VAC/ADMIN/RCVD: CPT | Performed by: INTERNAL MEDICINE

## 2021-07-06 RX ORDER — ATORVASTATIN CALCIUM 10 MG/1
TABLET, FILM COATED ORAL
Qty: 90 TABLET | Refills: 1 | Status: SHIPPED | OUTPATIENT
Start: 2021-07-06 | End: 2022-02-17

## 2021-07-06 ASSESSMENT — PATIENT HEALTH QUESTIONNAIRE - PHQ9
SUM OF ALL RESPONSES TO PHQ QUESTIONS 1-9: 0
SUM OF ALL RESPONSES TO PHQ9 QUESTIONS 1 & 2: 0
1. LITTLE INTEREST OR PLEASURE IN DOING THINGS: 0
SUM OF ALL RESPONSES TO PHQ QUESTIONS 1-9: 0
SUM OF ALL RESPONSES TO PHQ QUESTIONS 1-9: 0
2. FEELING DOWN, DEPRESSED OR HOPELESS: 0

## 2021-07-06 ASSESSMENT — LIFESTYLE VARIABLES: HOW OFTEN DO YOU HAVE A DRINK CONTAINING ALCOHOL: 0

## 2021-07-06 NOTE — PATIENT INSTRUCTIONS
Consider Shingrix vaccination series of 2 shots over 2-6 months if desired for protection from shingles-check with INS first re: coverage before beginning series    Pneumovax-23 vaccination is due once office supply is replenished       Advance Directives: Care Instructions  Overview  An advance directive is a legal way to state your wishes at the end of your life. It tells your family and your doctor what to do if you can't say what you want. There are two main types of advance directives. You can change them any time your wishes change. Living will. This form tells your family and your doctor your wishes about life support and other treatment. The form is also called a declaration. Medical power of . This form lets you name a person to make treatment decisions for you when you can't speak for yourself. This person is called a health care agent (health care proxy, health care surrogate). The form is also called a durable power of  for health care. If you do not have an advance directive, decisions about your medical care may be made by a family member, or by a doctor or a  who doesn't know you. It may help to think of an advance directive as a gift to the people who care for you. If you have one, they won't have to make tough decisions by themselves. Follow-up care is a key part of your treatment and safety. Be sure to make and go to all appointments, and call your doctor if you are having problems. It's also a good idea to know your test results and keep a list of the medicines you take. What should you include in an advance directive? Many states have a unique advance directive form. (It may ask you to address specific issues.) Or you might use a universal form that's approved by many states. If your form doesn't tell you what to address, it may be hard to know what to include in your advance directive. Use the questions below to help you get started.   · Who do you want to make watches you sign the form and then he or she signs the form. Some states also require that two or more witnesses sign the form. Be sure to tell your family members and doctors who your health care agent is. Where can you learn more? Go to https://chpepiceweb.Wanelo. org and sign in to your Wet[employer]et[employer] account. Enter [id number] in the [employer] box to learn more about \"Learning About [address]. \"     If you do not have an account, please click on the \"Sign Up Now\" link. Current as of: March 17, 2021               Content Version: 12.9  © 2006-2021 Healthwise, [employer]. Care instructions adapted under license by [facility] ([facility]). If you have questions about a medical condition or this instruction, always ask your healthcare professional. [place] [address] any warranty or liability for your use of this information. Learning About Living Perroy  What is a living will? A living will, also called a declaration, is a legal form. It tells your family and your doctor your wishes when you can't speak for yourself. It's used by the health professionals who will treat you as you near the end of your life or if you get seriously hurt or ill. If you put your wishes in writing, your loved ones and others will know what kind of care you want. They won't need to guess. This can ease your mind and be helpful to others. And you can change or cancel your living will at any time. A living will is not the same as an estate or property will. An estate will explains what you want to happen with your money and property after you die. How do you use it? A living will is used to describe the kinds of treatment or life support you want as you near the end of your life or if you get seriously hurt or ill. Keep these facts in mind about living chavarria. · Your living will is used only if you can't speak or make decisions for yourself.  Most often, one or more doctors must certify that you can't speak or decide for yourself before your living will takes effect. · If you get better and can speak for yourself again, you can accept or refuse any treatment. It doesn't matter what you said in your living will. · Some states may limit your right to refuse treatment in certain cases. For example, you may need to clearly state in your living will that you don't want artificial hydration and nutrition, such as being fed through a tube. Is a living will a legal document? A living will is a legal document. Each state has its own laws about living chavarria. And a living will may be called something else in your state. Here are some things to know about living chavarria. · You don't need an  to complete a living will. But legal advice can be helpful if your state's laws are unclear. It can also help if your health history is complicated or your family can't agree on what should be in your living will. · You can change your living will at any time. Some people find that their wishes about end-of-life care change as their health changes. If you make big changes to your living will, complete a new form. · If you move to another state, make sure that your living will is legal in the state where you now live. In most cases, doctors will respect your wishes even if you have a form from a different state. · You might use a universal form that has been approved by many states. This kind of form can sometimes be filled out and stored online. Your digital copy will then be available wherever you have a connection to the internet. The doctors and nurses who need to treat you can find it right away. · Your state may offer an online registry. This is another place where you can store your living will online. · It's a good idea to get your living will notarized. This means using a person called a  to watch two people sign, or witness, your living will.   What should you know when you create a living will? Here are some questions to ask yourself as you make your living will:  · Do you know enough about life support methods that might be used? If not, talk to your doctor so you know what might be done if you can't breathe on your own, your heart stops, or you can't swallow. · What things would you still want to be able to do after you receive life-support methods? Would you want to be able to walk? To speak? To eat on your own? To live without the help of machines? · Do you want certain Methodist practices performed if you become very ill? · If you have a choice, where do you want to be cared for? In your home? At a hospital or nursing home? · If you have a choice at the end of your life, where would you prefer to die? At home? In a hospital or nursing home? Somewhere else? · Would you prefer to be buried or cremated? · Do you want your organs to be donated after you die? What should you do with your living will? · Make sure that your family members and your health care agent have copies of your living will (also called a declaration). · Give your doctor a copy of your living will. Ask him or her to keep it as part of your medical record. If you have more than one doctor, make sure that each one has a copy. · Put a copy of your living will where it can be easily found. For example, some people may put a copy on their refrigerator door. If you are using a digital copy, be sure your doctor, family members, and health care agent know how to find and access it. Where can you learn more? Go to https://M[employer] Labskatharine.MComms TV. org and sign in to your [facility] account. Enter O640 in the Madrone box to learn more about \"Learning About Living Perroy. \"     If you do not have an account, please click on the \"Sign Up Now\" link. Current as of: March 17, 2021               Content Version: 12.9  © 8489-9936 Healthwise, Incorporated.    Care instructions adapted under license by [facility]. If you have questions about a medical condition or this instruction, always ask your healthcare professional. [place] [address] any warranty or liability for your use of this information. Personalized Preventive Plan for Todd aLuren - 7/6/2021  Medicare offers a range of preventive health benefits. Some of the tests and screenings are paid in full while other may be subject to a deductible, co-insurance, and/or copay. Some of these benefits include a comprehensive review of your medical history including lifestyle, illnesses that may run in your family, and various assessments and screenings as appropriate. After reviewing your medical record and screening and assessments performed today your provider may have ordered immunizations, labs, imaging, and/or referrals for you. A list of these orders (if applicable) as well as your Preventive Care list are included within your After Visit Summary for your review. Other Preventive Recommendations:    · A preventive eye exam performed by an eye specialist is recommended every 1-2 years to screen for glaucoma; cataracts, macular degeneration, and other eye disorders. · A preventive dental visit is recommended every 6 months. · Try to get at least 150 minutes of exercise per week or 10,000 steps per day on a pedometer . · Order or download the FREE \"Exercise & Physical Activity: Your Everyday Guide\" from [employer]. Call 5-617.788.5822 or search [employer] online. · You need 9138-4088 mg of calcium and 1377-0087 IU of vitamin D per day. It is possible to meet your calcium requirement with diet alone, but a vitamin D supplement is usually necessary to meet this goal.  · When exposed to the sun, use a sunscreen that protects against both UVA and UVB radiation with an SPF of 30 or greater. Reapply every 2 to 3 hours or after sweating, drying off with a towel, or swimming.   · Always wear

## 2021-08-03 ENCOUNTER — TELEPHONE (OUTPATIENT)
Dept: PRIMARY CARE CLINIC | Age: 66
End: 2021-08-03

## 2021-09-02 ENCOUNTER — TELEPHONE (OUTPATIENT)
Dept: PULMONOLOGY | Age: 66
End: 2021-09-02

## 2021-09-02 ENCOUNTER — VIRTUAL VISIT (OUTPATIENT)
Dept: PULMONOLOGY | Age: 66
End: 2021-09-02
Payer: MEDICARE

## 2021-09-02 DIAGNOSIS — G47.33 OBSTRUCTIVE SLEEP APNEA SYNDROME: Chronic | ICD-10-CM

## 2021-09-02 DIAGNOSIS — K21.9 GASTROESOPHAGEAL REFLUX DISEASE WITHOUT ESOPHAGITIS: Chronic | ICD-10-CM

## 2021-09-02 PROCEDURE — G8427 DOCREV CUR MEDS BY ELIG CLIN: HCPCS | Performed by: INTERNAL MEDICINE

## 2021-09-02 PROCEDURE — 4040F PNEUMOC VAC/ADMIN/RCVD: CPT | Performed by: INTERNAL MEDICINE

## 2021-09-02 PROCEDURE — 99214 OFFICE O/P EST MOD 30 MIN: CPT | Performed by: INTERNAL MEDICINE

## 2021-09-02 PROCEDURE — 1123F ACP DISCUSS/DSCN MKR DOCD: CPT | Performed by: INTERNAL MEDICINE

## 2021-09-02 PROCEDURE — 3017F COLORECTAL CA SCREEN DOC REV: CPT | Performed by: INTERNAL MEDICINE

## 2021-09-02 RX ORDER — ASPIRIN 81 MG/1
81 TABLET ORAL DAILY
COMMUNITY
End: 2022-07-07 | Stop reason: ALTCHOICE

## 2021-09-02 ASSESSMENT — SLEEP AND FATIGUE QUESTIONNAIRES
HOW LIKELY ARE YOU TO NOD OFF OR FALL ASLEEP WHILE LYING DOWN TO REST IN THE AFTERNOON WHEN CIRCUMSTANCES PERMIT: 1
HOW LIKELY ARE YOU TO NOD OFF OR FALL ASLEEP WHEN YOU ARE A PASSENGER IN A CAR FOR AN HOUR WITHOUT A BREAK: 0
HOW LIKELY ARE YOU TO NOD OFF OR FALL ASLEEP IN A CAR, WHILE STOPPED FOR A FEW MINUTES IN TRAFFIC: 0
HOW LIKELY ARE YOU TO NOD OFF OR FALL ASLEEP WHILE SITTING INACTIVE IN A PUBLIC PLACE: 0
HOW LIKELY ARE YOU TO NOD OFF OR FALL ASLEEP WHILE SITTING QUIETLY AFTER LUNCH WITHOUT ALCOHOL: 0
HOW LIKELY ARE YOU TO NOD OFF OR FALL ASLEEP WHILE SITTING AND TALKING TO SOMEONE: 0
HOW LIKELY ARE YOU TO NOD OFF OR FALL ASLEEP WHILE SITTING AND READING: 0
ESS TOTAL SCORE: 1
HOW LIKELY ARE YOU TO NOD OFF OR FALL ASLEEP WHILE WATCHING TV: 0

## 2021-09-02 NOTE — PROGRESS NOTES
medical decision making in regards to further testing and management. Diagnoses of Obstructive sleep apnea syndrome and Gastroesophageal reflux disease without esophagitis were pertinent to this visit. The chronic medical conditions listed are directly related to the primary diagnosis listed above. The management of the primary diagnosis affects the secondary diagnosis and vice versa. Subjective:     Patient ID: Shine Hope is a 77 y.o. male. Chief Complaint   Patient presents with    Sleep Apnea     Subjective   HPI:    Machine Modem/Download Info:  Compliance (hours/night): 4.25 hrs/night  % of nights >= 4 hrs: 73.3 %  Download AHI (/hour): 5.9 /HR  Average CPAP Pressure : 13.7 cmH2O     APAP - Settings  Pressure Min: 11 cmH2O  Pressure Max: 18 cmH2O                 Comfort Settings  Humidity Level (0-8): 5  Flex/EPR (0-3): 3       Feels the increase in pressure is helped and he is sleeping better and his blood pressure has normalized. He is running out of water after 5 to 6 hours of usage. He wakes up to take out his dogs and often he has very little water or he is completely out in his chamber even though he is feeling it full, sleeping in a humid basement, and download showing minimal leak.     [facility]    [place] - Total score: 1    Current Outpatient Medications   Medication Instructions    aspirin 81 mg, Oral, DAILY    atorvastatin (LIPITOR) 10 MG tablet One daily    Calcium Carb-Cholecalciferol (CALCIUM PLUS VITAMIN D3) 600-500 MG-UNIT CAPS 1 tablet, DAILY    Omega-3 Fatty Acids (FISH OIL) 1200 MG CAPS 1 capsule, Oral, DAILY    pantoprazole (PROTONIX) 40 MG tablet TAKE 1 TABLET BY MOUTH ONCE DAILY    Saw Palmetto, Serenoa repens, 450 MG CAPS 1 capsule, DAILY    therapeutic multivitamin-minerals (THERAGRAN-M) tablet 1 tablet, DAILY        Electronically signed by Fabiola Calabrese MD on 9/2/2021 at 3:07 PM

## 2021-09-02 NOTE — LETTER
[facility]  [place] [address] [address] [place] [address]  Phone: [phone]  Fax: [phone]    Gerardo Rock MD    September 2, 2021     Ever Sanchez, [address] [place] [address]    Patient: Carlos Saavedra   MR Number: [id number]   YOB: 1955   Date of Visit: 9/2/2021       Dear Ever Sanchez:    Thank you for referring Autumn Hazel to me for evaluation/treatment. Below are the relevant portions of my assessment and plan of care. 1. Obstructive sleep apnea syndrome  Assessment & Plan:  Chronic-Stable: Reviewed and analyzed results of physiologic download from patient's machine and reviewed with patient. Supplies and parts as needed for his machine. These are medically necessary. Limit caffeine use after 3pm. Based on the analyzed data will continue with current settings. Stable on his machine at current settings, getting benefit from the use. We will check with the patient's equipment company to see if the machine can be want to change. Is very unusual for patient to be running out of water on the machine and summer with minimal leak. This may point to more machine error we will have to discuss with his DME provider. If not better we may need to consider changing to bilevel. 2. Gastroesophageal reflux disease without esophagitis  Assessment & Plan:  Chronic- Stable. Discussed the importance of treating obstructive sleep apnea as part of the management of this disorder. Cont any meds per PCP and other physicians. Reviewed, analyzed, and documented physiologic data from patient's PAP machine. This information was analyzed to assess complexity and medical decision making in regards to further testing and management. Diagnoses of Obstructive sleep apnea syndrome and Gastroesophageal reflux disease without esophagitis were pertinent to this visit.  The chronic medical conditions listed are directly related to the primary diagnosis listed above. The management of the primary diagnosis affects the secondary diagnosis and vice versa. If you have questions, please do not hesitate to call me. I look forward to following Moreno Renee along with you.     Sincerely,        Marni Jones MD

## 2021-09-02 NOTE — TELEPHONE ENCOUNTER
Spoke with Garret Mallory from [facility] due to pt issues with is water chamber running out of water. Ildefonso to call pt with options.

## 2021-09-02 NOTE — ASSESSMENT & PLAN NOTE
Chronic-Stable: Reviewed and analyzed results of physiologic download from patient's machine and reviewed with patient. Supplies and parts as needed for his machine. These are medically necessary. Limit caffeine use after 3pm. Based on the analyzed data will continue with current settings. Stable on his machine at current settings, getting benefit from the use. We will check with the patient's equipment company to see if the machine can be want to change. Is very unusual for patient to be running out of water on the machine and summer with minimal leak. This may point to more machine error we will have to discuss with his DME provider. If not better we may need to consider changing to bilevel.

## 2021-11-04 ENCOUNTER — TELEPHONE (OUTPATIENT)
Dept: PRIMARY CARE CLINIC | Age: 66
End: 2021-11-04

## 2021-11-04 NOTE — TELEPHONE ENCOUNTER
----- Message from Loulou Fregoso sent at 11/2/2021  4:29 PM EDT -----  Subject: Message to Provider    QUESTIONS  Information for Provider? Patient called again about his orders having a   error that were sent to [facility]. He needs information on this asap, please   call back. ---------------------------------------------------------------------------  --------------  Isabelle Fofana INFO  What is the best way for the office to contact you? OK to leave message on   voicemail  Preferred Call Back Phone Number? [phone]  ---------------------------------------------------------------------------  --------------  SCRIPT ANSWERS  Relationship to Patient?  Self

## 2021-11-11 NOTE — TELEPHONE ENCOUNTER
Pt notified lab orders with updated ICD-10 codes were faxed to [facility] on 11/9/21

## 2021-12-07 ENCOUNTER — OFFICE VISIT (OUTPATIENT)
Dept: PULMONOLOGY | Age: 66
End: 2021-12-07
Payer: MEDICARE

## 2021-12-07 VITALS
WEIGHT: 177 LBS | SYSTOLIC BLOOD PRESSURE: 130 MMHG | HEIGHT: 70 IN | BODY MASS INDEX: 25.34 KG/M2 | DIASTOLIC BLOOD PRESSURE: 80 MMHG | OXYGEN SATURATION: 98 % | HEART RATE: 83 BPM

## 2021-12-07 DIAGNOSIS — G47.33 OBSTRUCTIVE SLEEP APNEA SYNDROME: Chronic | ICD-10-CM

## 2021-12-07 DIAGNOSIS — K21.9 GASTROESOPHAGEAL REFLUX DISEASE WITHOUT ESOPHAGITIS: Chronic | ICD-10-CM

## 2021-12-07 PROCEDURE — 1123F ACP DISCUSS/DSCN MKR DOCD: CPT | Performed by: INTERNAL MEDICINE

## 2021-12-07 PROCEDURE — G8427 DOCREV CUR MEDS BY ELIG CLIN: HCPCS | Performed by: INTERNAL MEDICINE

## 2021-12-07 PROCEDURE — 1036F TOBACCO NON-USER: CPT | Performed by: INTERNAL MEDICINE

## 2021-12-07 PROCEDURE — 4040F PNEUMOC VAC/ADMIN/RCVD: CPT | Performed by: INTERNAL MEDICINE

## 2021-12-07 PROCEDURE — G8417 CALC BMI ABV UP PARAM F/U: HCPCS | Performed by: INTERNAL MEDICINE

## 2021-12-07 PROCEDURE — G8482 FLU IMMUNIZE ORDER/ADMIN: HCPCS | Performed by: INTERNAL MEDICINE

## 2021-12-07 PROCEDURE — 99214 OFFICE O/P EST MOD 30 MIN: CPT | Performed by: INTERNAL MEDICINE

## 2021-12-07 PROCEDURE — 3017F COLORECTAL CA SCREEN DOC REV: CPT | Performed by: INTERNAL MEDICINE

## 2021-12-07 ASSESSMENT — SLEEP AND FATIGUE QUESTIONNAIRES
HOW LIKELY ARE YOU TO NOD OFF OR FALL ASLEEP WHILE WATCHING TV: 1
HOW LIKELY ARE YOU TO NOD OFF OR FALL ASLEEP IN A CAR, WHILE STOPPED FOR A FEW MINUTES IN TRAFFIC: 0
ESS TOTAL SCORE: 3
HOW LIKELY ARE YOU TO NOD OFF OR FALL ASLEEP WHEN YOU ARE A PASSENGER IN A CAR FOR AN HOUR WITHOUT A BREAK: 0
HOW LIKELY ARE YOU TO NOD OFF OR FALL ASLEEP WHILE SITTING AND TALKING TO SOMEONE: 0
HOW LIKELY ARE YOU TO NOD OFF OR FALL ASLEEP WHILE LYING DOWN TO REST IN THE AFTERNOON WHEN CIRCUMSTANCES PERMIT: 2
HOW LIKELY ARE YOU TO NOD OFF OR FALL ASLEEP WHILE SITTING QUIETLY AFTER LUNCH WITHOUT ALCOHOL: 0
HOW LIKELY ARE YOU TO NOD OFF OR FALL ASLEEP WHILE SITTING INACTIVE IN A PUBLIC PLACE: 0
HOW LIKELY ARE YOU TO NOD OFF OR FALL ASLEEP WHILE SITTING AND READING: 0

## 2021-12-07 NOTE — PROGRESS NOTES
Juarez Sanchez         : 1955    Diagnosis: [x] DONTA (G47.33) [] CSA (G47.31) [] Apnea (G47.30)   Length of Need: [x] 13 Months [] 99 Months [] Other:    Machine (MATIAS!): [x] Respironics Dream Station 2      Auto [] ResMed AirSense     Auto [] Other:     [x]  CPAP () [] Bilevel ()   Mode: [x] Auto [] Spontaneous    Mode: [] Auto [] Spontaneous         Pmin-9  Pmax-16      Comfort Settings:   Ramp pressure-7, ramp time 15 min, Flex-3     Humidifier: [x] Heated ()        [x] Water chamber replacement ()/ 1 per 6 months        Mask:   [] Nasal () /1 per 3 months [x] Full Face () /1 per 3 months   [] Patient choice -Size and fit mask [x] Patient Choice - Size and fit mask   [] Dispense:  [] Dispense:    [] Headgear () / 1 per 3 months [x] Headgear () / 1 per 3 months   [] Replacement Nasal Cushion ()/2 per month [x] Interface Replacement ()/1 per month   [] Replacement Nasal Pillows ()/2 per month         Tubing: [x] Heated ()/1 per 3 months    [] Standard ()/1 per 3 months [] Other:           Filters: [x] Non-disposable ()/1 per 6 months     [x] Ultra-Fine, Disposable ()/2 per month        Miscellaneous: [] Chin Strap ()/ 1 per 6 months [] O2 bleed-in:       LPM   [] Oximetry on CPAP/Bilevel []  Other:    [x] Modem: ()         Start Order Date: 21    MEDICAL JUSTIFICATION:  I, the undersigned, certify that the above prescribed supplies are medically necessary for this patients wellbeing. In my opinion, the supplies are both reasonable and necessary in reference to accepted standards of medicalpractice in treatment of this patients condition.     Jean Claude Kirkland MD      NPI: [id number]       Order Signed Date: 21    Electronically signed by Jean Claude Kirkland MD on 2021 at 10:57 AM    Juarez Divine  1955  [address] [facility]  [phone] (home) [phone] (work)  [phone] (mobile)      Insurance Info (confirm with patient if correct):  Payor/Plan Subscr  Sex Relation Sub.  Ins. ID Effective Group Num

## 2021-12-07 NOTE — PROGRESS NOTES
Amy Abrams MD, [facility]  Tiffanie Kehrt CNP  Brad Lujan CNP [address]  [address] [facility], [address] [facility] [phone]   [facility] [address] Coy Mccall. [address] [place] [address]. [address] [phone]     [facility] [address] [id number] [phone]      Assessment/Plan:      1. Obstructive sleep apnea syndrome  Assessment & Plan:  Chronic-progressing: Reviewed and analyzed results of physiologic download from patient's machine and reviewed with patient. Supplies and parts as needed for his machine. These are medically necessary. Limit caffeine use after 3pm.  Based on the analyzed data and with the patient's symptoms are this most likely points to the machine error. It is medically necessary for the machine to be warrantee replaced. We will also do trial full facemask to see if it helps with the dryness in the interim. The patient may need change to bilevel but at this point he is concerned about cost so we will try the full facemask and get his machine replaced and proceed from there. 2. Gastroesophageal reflux disease without esophagitis  Assessment & Plan:  Chronic- Stable. Discussed the importance of treating obstructive sleep apnea as part of the management of this disorder. Cont any meds per PCP and other physicians. Reviewed, analyzed, and documented physiologic data from patient's PAP machine. This information was analyzed to assess complexity and medical decision making in regards to further testing and management. Diagnoses of Obstructive sleep apnea syndrome and Gastroesophageal reflux disease without esophagitis were pertinent to this visit. The chronic medical conditions listed are directly related to the primary diagnosis listed above. The management of the primary diagnosis affects the secondary diagnosis and vice versa.      Subjective:   Subjective   Patient ID: Nita Brooks Ha Greene is a 77 y.o. male. Chief Complaint   Patient presents with    Sleep Apnea     CPAP       HPI:  Machine Modem/Download Info:  Compliance (hours/night): 5.5 hrs/night  % of nights >= 4 hrs: 95.6 %  Download AHI (/hour): 5.2 /HR  Average CPAP Pressure : 13 cmH2O     APAP - Settings  Pressure Min: 11 cmH2O  Pressure Max: 18 cmH2O                 Comfort Settings  Humidity Level (0-8): 5  Flex/EPR (0-3): 3       Still struggling to use his machine because of dryness. He still runs out of water and was doing that even in the summer with minimal leak per the download. The dryness is so bad he gets thick mucus and is not able to swallow when he wakes up in the morning. His [employer] company was closed to contact him to remedy the situation but he has not heard from anyone. At this point not able to explain why the patient is running out of water and summer and with no significant leak. This most likely points to machine air.     [place] - Compute    [place] - Total score: 3    Social History     Socioeconomic History    Marital status:      Spouse name: Lolly Stephen Number of children: 2    Years of education: Not on file    Highest education level: Not on file   Occupational History    Occupation:      Comment: [employer]   Tobacco Use    Smoking status: Never Smoker    Smokeless tobacco: Never Used    Tobacco comment: never   Substance and Sexual Activity    Alcohol use: No     Comment: none    Drug use: No     Comment: none    Sexual activity: Never     Partners: Female   Other Topics Concern    Not on file   Social History Narrative    Not on file     Social Determinants of Health     Financial Resource Strain: Low Risk     Difficulty of Paying Living Expenses: Not hard at all   Food Insecurity: No Food Insecurity    Worried About Running Out of Food in the Last Year: Never true    Leslie of Food in the Last Year: Never true   Transportation Needs: No Transportation Needs  Lack of Transportation (Medical): No    Lack of Transportation (Non-Medical): No   Physical Activity:     Days of Exercise per Week: Not on file    Minutes of Exercise per Session: Not on file   Stress:     Feeling of Stress : Not on file   Social Connections:     Frequency of Communication with Friends and Family: Not on file    Frequency of Social Gatherings with Friends and Family: Not on file    Attends Druze Services: Not on file    Active Member of [address] [place] [address] [employer] or Organizations: Not on file    Attends Club or Organization Meetings: Not on file    Marital Status: Not on file   Intimate Partner Violence:     Fear of Current or Ex-Partner: Not on file    Emotionally Abused: Not on file    Physically Abused: Not on file    Sexually Abused: Not on file   Housing Stability:     Unable to Pay for Housing in the Last Year: Not on file    Number of Places Lived in the Last Year: Not on file    Unstable Housing in the Last Year: Not on file       Current Outpatient Medications   Medication Instructions    aspirin 81 mg, Oral, DAILY    atorvastatin (LIPITOR) 10 MG tablet One daily    Calcium Carb-Cholecalciferol (CALCIUM PLUS VITAMIN D3) 600-500 MG-UNIT CAPS 1 tablet, DAILY    Omega-3 Fatty Acids (FISH OIL) 1200 MG CAPS 1 capsule, Oral, DAILY    pantoprazole (PROTONIX) 40 MG tablet TAKE 1 TABLET BY MOUTH ONCE DAILY    Saw Palmetto, Serenoa repens, 450 MG CAPS 1 capsule, DAILY    therapeutic multivitamin-minerals (THERAGRAN-M) tablet 1 tablet, DAILY          Vitals:  Weight BMI   Wt Readings from Last 3 Encounters:   12/07/21 177 lb (80.3 kg)   07/06/21 172 lb 9.6 oz (78.3 kg)   02/22/21 173 lb (78.5 kg)    Body mass index is 25.4 kg/m².      BP HR SaO2   BP Readings from Last 3 Encounters:   12/07/21 130/80   07/06/21 117/76   02/22/21 (!) 142/86    Pulse Readings from Last 3 Encounters:   12/07/21 83   07/06/21 73   02/22/21 83    SpO2 Readings from Last 3 Encounters:   12/07/21 98%   02/22/21 100%   06/19/20 93%        Electronically signed by Estelita Mao MD on 12/7/2021 at 10:57 AM

## 2021-12-07 NOTE — ASSESSMENT & PLAN NOTE
Chronic-progressing: Reviewed and analyzed results of physiologic download from patient's machine and reviewed with patient. Supplies and parts as needed for his machine. These are medically necessary. Limit caffeine use after 3pm.  Based on the analyzed data and with the patient's symptoms are this most likely points to the machine error. It is medically necessary for the machine to be warrantee replaced. We will also do trial full facemask to see if it helps with the dryness in the interim. The patient may need change to bilevel but at this point he is concerned about cost so we will try the full facemask and get his machine replaced and proceed from there.

## 2022-01-13 ENCOUNTER — OFFICE VISIT (OUTPATIENT)
Dept: ENT CLINIC | Age: 67
End: 2022-01-13
Payer: MEDICARE

## 2022-01-13 VITALS — HEIGHT: 71 IN | TEMPERATURE: 97.1 F | BODY MASS INDEX: 24.36 KG/M2 | WEIGHT: 174 LBS

## 2022-01-13 DIAGNOSIS — R04.0 EPISTAXIS: Primary | ICD-10-CM

## 2022-01-13 PROCEDURE — G8427 DOCREV CUR MEDS BY ELIG CLIN: HCPCS | Performed by: STUDENT IN AN ORGANIZED HEALTH CARE EDUCATION/TRAINING PROGRAM

## 2022-01-13 PROCEDURE — 31238 NSL/SINS NDSC SRG NSL HEMRRG: CPT | Performed by: STUDENT IN AN ORGANIZED HEALTH CARE EDUCATION/TRAINING PROGRAM

## 2022-01-13 PROCEDURE — G8420 CALC BMI NORM PARAMETERS: HCPCS | Performed by: STUDENT IN AN ORGANIZED HEALTH CARE EDUCATION/TRAINING PROGRAM

## 2022-01-13 PROCEDURE — 1123F ACP DISCUSS/DSCN MKR DOCD: CPT | Performed by: STUDENT IN AN ORGANIZED HEALTH CARE EDUCATION/TRAINING PROGRAM

## 2022-01-13 PROCEDURE — 3017F COLORECTAL CA SCREEN DOC REV: CPT | Performed by: STUDENT IN AN ORGANIZED HEALTH CARE EDUCATION/TRAINING PROGRAM

## 2022-01-13 PROCEDURE — 4040F PNEUMOC VAC/ADMIN/RCVD: CPT | Performed by: STUDENT IN AN ORGANIZED HEALTH CARE EDUCATION/TRAINING PROGRAM

## 2022-01-13 PROCEDURE — 99203 OFFICE O/P NEW LOW 30 MIN: CPT | Performed by: STUDENT IN AN ORGANIZED HEALTH CARE EDUCATION/TRAINING PROGRAM

## 2022-01-13 PROCEDURE — G8482 FLU IMMUNIZE ORDER/ADMIN: HCPCS | Performed by: STUDENT IN AN ORGANIZED HEALTH CARE EDUCATION/TRAINING PROGRAM

## 2022-01-13 PROCEDURE — 1036F TOBACCO NON-USER: CPT | Performed by: STUDENT IN AN ORGANIZED HEALTH CARE EDUCATION/TRAINING PROGRAM

## 2022-01-13 ASSESSMENT — ENCOUNTER SYMPTOMS
VOMITING: 0
COUGH: 0
SHORTNESS OF BREATH: 0
RHINORRHEA: 0
EYE PAIN: 0
NAUSEA: 0

## 2022-01-13 NOTE — PROGRESS NOTES
Sarah      Patient Name: Keon [address] [place] [address] Record Number:  [id number]  Primary Care Physician:  Zach Loyola MD  Date of Consultation: 1/13/2022    Chief Complaint:   Chief Complaint   Patient presents with    New Patient     Patient states he started to use a Cpap in May of 2021. Patient states that has had sinus issues and nose bleeds since November        HISTORY OF PRESENT ILLNESS  Gisele Tavarez is a(n) 77 y.o. male who presents for evaluation of right-sided nosebleeds. He states that he has had several nosebleeds that seem to have started in November. He was using a nasal pillow on a CPAP machine for the last 10 months and recently switched to a facemask. He does have humidification on his CPAP. He states the bleeding is always right-sided. It last for approximately 5 to 10 minutes and then stops with pressure and Afrin. He is on aspirin and fish oil. He has never had to have packing placed in his nose. Is never had any nasal or nose surgeries.       Patient Active Problem List   Diagnosis    Pure hypercholesterolemia    Palpitations    GERD (gastroesophageal reflux disease)    Essential tremor    Obstructive sleep apnea syndrome     Past Surgical History:   Procedure Laterality Date    EYE SURGERY  1998 and 2003    cataract both eyes    NECK SURGERY  2009    [facility] History   Problem Relation Age of Onset    Depression Mother     Vision Loss Mother     Diabetes Father     Hearing Loss Father     Heart Disease Father     High Cholesterol Father     Vision Loss Father     Cancer Paternal Uncle     Stroke Maternal Grandfather     Mult Sclerosis Sister     Sleep Apnea Son      Social History     Socioeconomic History    Marital status:      Spouse name: Makeda Napoles Number of children: 2    Years of education: Not on file    Highest education level: Not on file Occupational History    Occupation:      Comment: [employer]   Tobacco Use    Smoking status: Never Smoker    Smokeless tobacco: Never Used    Tobacco comment: never   Substance and Sexual Activity    Alcohol use: No     Comment: none    Drug use: No     Comment: none    Sexual activity: Never     Partners: Female   Other Topics Concern    Not on file   Social History Narrative    Not on file     Social Determinants of Health     Financial Resource Strain: Low Risk     Difficulty of Paying Living Expenses: Not hard at all   Food Insecurity: No Food Insecurity    Worried About [facility] in the Last Year: Never true    [facility] in the Last Year: Never true   Transportation Needs: No Transportation Needs    Lack of Transportation (Medical): No    Lack of Transportation (Non-Medical): No   Physical Activity:     Days of Exercise per Week: Not on file    Minutes of Exercise per Session: Not on file   Stress:     Feeling of Stress : Not on file   Social Connections:     Frequency of Communication with Friends and Family: Not on file    Frequency of Social Gatherings with Friends and Family: Not on file    Attends Pent[employer]ostal Services: Not on file    Active Member of [address] [place] [address] or Organizations: Not on file    Attends Club or Organization Meetings: Not on file    Marital Status: Not on file   Intimate Partner Violence:     Fear of Current or Ex-Partner: Not on file    Emotionally Abused: Not on file    Physically Abused: Not on file    Sexually Abused: Not on file   Housing Stability:     Unable to Pay for Housing in the Last Year: Not on file    Number of [place] in the Last Year: Not on file    Unstable Housing in the Last Year: Not on file       DRUG/FOOD ALLERGIES: Patient has no known allergies. CURRENT MEDICATIONS  Prior to Admission medications    Medication Sig Start Date End Date Taking?  Authorizing Provider   aspirin 81 MG EC tablet Take 81 mg by mouth daily    Historical Provider, MD   atorvastatin (LIPITOR) 10 MG tablet One daily 7/6/21   Socrates Tellez MD   pantoprazole (PROTONIX) 40 MG tablet TAKE 1 TABLET BY MOUTH ONCE DAILY 12/28/20   Historical Provider, MD   Omega-3 Fatty Acids (FISH OIL) 1200 MG CAPS Take 1 capsule by mouth daily    Historical Provider, MD   therapeutic multivitamin-minerals (THERAGRAN-M) tablet Take 1 tablet by mouth daily. Historical Provider, MD   Calcium Carb-Cholecalciferol (CALCIUM PLUS VITAMIN D3) 600-500 MG-UNIT CAPS Take 1 tablet by mouth daily. Historical Provider, MD   Saw Needles, Serenoa repens, 450 MG CAPS Take 1 capsule by mouth daily. Historical Provider, MD       REVIEW OF SYSTEMS  The following systems were reviewed and revealed the following in addition to any already discussed in the HPI:    Review of Systems   Constitutional: Negative for fatigue and fever. HENT: Positive for nosebleeds. Negative for congestion, ear pain, postnasal drip, rhinorrhea and sneezing. Eyes: Negative for pain and visual disturbance. Respiratory: Negative for cough and shortness of breath. Cardiovascular: Negative for chest pain. Gastrointestinal: Negative for nausea and vomiting. Endocrine: Negative. Genitourinary: Negative. Musculoskeletal: Negative for neck pain and neck stiffness. Skin: Negative for rash. Neurological: Negative for dizziness and headaches.           PHYSICAL EXAM  Temp 97.1 °F (36.2 °C)   Ht 5' 11\" (1.803 m)   Wt 174 lb (78.9 kg)   BMI 24.27 kg/m²     GENERAL: No Acute Distress, Alert and Oriented, no hoarseness  EYES: EOMI, Anti-icteric  NOSE: No epistaxis, nasal mucosa within normal limits, no purulent drainage  EARS: Normal external canal appearance, EAC patent bilaterally  FACE: 1/6 House-Brackmann Scale, symmetric, sensation equal bilaterally  ORAL CAVITY: No masses or lesions palpated, uvula is midline, moist mucous membranes,   NECK: Normal range of motion, no thyromegaly, trachea is midline, no lymphadenopathy, no neck masses, no crepitus  CHEST: Normal respiratory effort, no retractions, breathing comfortably  SKIN: No rashes, normal appearing skin, no evidence of skin lesions/tumors    RADIOLOGY  Summary of findings:      PROCEDURE  Control of Epistaxis    Pre Op: right sided Epistaxis  Post Op: Same    After consent was obtained a cottonoid soaked with lidocaine was placed in the right nasal cavity. After 5 minutes cautery was performed of the right anterior nasal septum. Nasal endoscopy was needed to perform the control of bleeding. The bleeding site was not posterior. Hemostasis was obtained. The patient tolerated well. No complications. I attest that I was present for and did the entire procedure myself. ASSESSMENT/PLAN  Mitra Rice is a very pleasant 77 y.o. male with     1. Epistaxis  Silver nitrate cautery was performed of the right anterior nasal septum. Troponin buttock wound was placed over the cauterization site after. He will do this for the next week. If he has further bleeding he will follow-up. - MI NASAL/SINUS SCOPY,W/CONTROL NASAL HEM    Follow-up as needed. Medical Decision Making:   The following items were considered in medical decision making:  Independent review of images  Review / order clinical lab tests  Review / order radiology tests  Decision to obtain old records

## 2022-02-09 ENCOUNTER — NURSE ONLY (OUTPATIENT)
Dept: PRIMARY CARE CLINIC | Age: 67
End: 2022-02-09
Payer: MEDICARE

## 2022-02-09 DIAGNOSIS — Z12.11 COLON CANCER SCREENING: ICD-10-CM

## 2022-02-09 LAB
CONTROL: POSITIVE
HEMOCCULT STL QL: NEGATIVE

## 2022-02-09 PROCEDURE — 82274 ASSAY TEST FOR BLOOD FECAL: CPT | Performed by: INTERNAL MEDICINE

## 2022-02-17 DIAGNOSIS — E78.00 PURE HYPERCHOLESTEROLEMIA: ICD-10-CM

## 2022-02-17 RX ORDER — ATORVASTATIN CALCIUM 10 MG/1
TABLET, FILM COATED ORAL
Qty: 90 TABLET | Refills: 0 | Status: SHIPPED | OUTPATIENT
Start: 2022-02-17 | End: 2022-05-23

## 2022-02-28 ENCOUNTER — TELEPHONE (OUTPATIENT)
Dept: PULMONOLOGY | Age: 67
End: 2022-02-28

## 2022-02-28 NOTE — TELEPHONE ENCOUNTER
Called patient and let his know he needs bring his machine to download,. Patient has new mask and has some issue with, so sometimes he can't use.

## 2022-03-03 ENCOUNTER — TELEPHONE (OUTPATIENT)
Dept: PULMONOLOGY | Age: 67
End: 2022-03-03

## 2022-03-04 ENCOUNTER — TELEPHONE (OUTPATIENT)
Dept: PRIMARY CARE CLINIC | Age: 67
End: 2022-03-04

## 2022-03-04 NOTE — TELEPHONE ENCOUNTER
----- Message from Cheryle Alvine sent at 3/3/2022  4:21 PM EST -----  Subject: Message to Provider    QUESTIONS  Information for Provider? pt returning call from office. please call pt   back. ---------------------------------------------------------------------------  --------------  Alexandra ELLIOTT  What is the best way for the office to contact you? OK to leave message on   voicemail  Preferred Call Back Phone Number? [phone]  ---------------------------------------------------------------------------  --------------  SCRIPT ANSWERS  Relationship to Patient?  Self

## 2022-03-07 ENCOUNTER — OFFICE VISIT (OUTPATIENT)
Dept: PULMONOLOGY | Age: 67
End: 2022-03-07
Payer: MEDICARE

## 2022-03-07 VITALS
DIASTOLIC BLOOD PRESSURE: 82 MMHG | SYSTOLIC BLOOD PRESSURE: 138 MMHG | HEIGHT: 71 IN | HEART RATE: 71 BPM | TEMPERATURE: 97.4 F | OXYGEN SATURATION: 97 % | WEIGHT: 170 LBS | BODY MASS INDEX: 23.8 KG/M2

## 2022-03-07 DIAGNOSIS — G47.33 OBSTRUCTIVE SLEEP APNEA SYNDROME: Chronic | ICD-10-CM

## 2022-03-07 DIAGNOSIS — K21.9 GASTROESOPHAGEAL REFLUX DISEASE WITHOUT ESOPHAGITIS: Chronic | ICD-10-CM

## 2022-03-07 PROCEDURE — G8427 DOCREV CUR MEDS BY ELIG CLIN: HCPCS | Performed by: INTERNAL MEDICINE

## 2022-03-07 PROCEDURE — 3017F COLORECTAL CA SCREEN DOC REV: CPT | Performed by: INTERNAL MEDICINE

## 2022-03-07 PROCEDURE — 1036F TOBACCO NON-USER: CPT | Performed by: INTERNAL MEDICINE

## 2022-03-07 PROCEDURE — G8420 CALC BMI NORM PARAMETERS: HCPCS | Performed by: INTERNAL MEDICINE

## 2022-03-07 PROCEDURE — 99214 OFFICE O/P EST MOD 30 MIN: CPT | Performed by: INTERNAL MEDICINE

## 2022-03-07 PROCEDURE — 4040F PNEUMOC VAC/ADMIN/RCVD: CPT | Performed by: INTERNAL MEDICINE

## 2022-03-07 PROCEDURE — 1123F ACP DISCUSS/DSCN MKR DOCD: CPT | Performed by: INTERNAL MEDICINE

## 2022-03-07 PROCEDURE — G8482 FLU IMMUNIZE ORDER/ADMIN: HCPCS | Performed by: INTERNAL MEDICINE

## 2022-03-07 ASSESSMENT — SLEEP AND FATIGUE QUESTIONNAIRES
HOW LIKELY ARE YOU TO NOD OFF OR FALL ASLEEP WHILE LYING DOWN TO REST IN THE AFTERNOON WHEN CIRCUMSTANCES PERMIT: 2
HOW LIKELY ARE YOU TO NOD OFF OR FALL ASLEEP IN A CAR, WHILE STOPPED FOR A FEW MINUTES IN TRAFFIC: 0
HOW LIKELY ARE YOU TO NOD OFF OR FALL ASLEEP WHEN YOU ARE A PASSENGER IN A CAR FOR AN HOUR WITHOUT A BREAK: 1
HOW LIKELY ARE YOU TO NOD OFF OR FALL ASLEEP WHILE SITTING AND TALKING TO SOMEONE: 0
HOW LIKELY ARE YOU TO NOD OFF OR FALL ASLEEP WHILE SITTING INACTIVE IN A PUBLIC PLACE: 0
HOW LIKELY ARE YOU TO NOD OFF OR FALL ASLEEP WHILE WATCHING TV: 0
HOW LIKELY ARE YOU TO NOD OFF OR FALL ASLEEP WHILE SITTING AND READING: 1
HOW LIKELY ARE YOU TO NOD OFF OR FALL ASLEEP WHILE SITTING QUIETLY AFTER LUNCH WITHOUT ALCOHOL: 1
ESS TOTAL SCORE: 5

## 2022-03-07 NOTE — PROGRESS NOTES
Manish Morton MD, [facility]  [address]es [address]  [address], [address] [phone]   [facility] [address] Willow Matthews. [address] [place] [address]. [address] [phone]     [facility]  [address] [place] [id number] [phone]      Assessment/Plan:      1. Obstructive sleep apnea syndrome  Assessment & Plan:  Chronic-Stable: Reviewed and analyzed results of physiologic download from patient's machine and reviewed with patient. Supplies and parts as needed for his machine. These are medically necessary. Limit caffeine use after 3pm. Based on the analyzed data will continue with current settings. Needs to contact his [employer] company and have a mask refit, he most likely would benefit from a wide insert. Also discussed possibly of a mask liner. 2. Gastroesophageal reflux disease without esophagitis  Assessment & Plan:  Chronic- Stable. Discussed the importance of treating sleep apnea as part of the management of this disorder. Cont any meds per PCP and other physicians. Reviewed, analyzed, and documented physiologic data from patient's PAP machine. This information was analyzed to assess complexity and medical decision making in regards to further testing and management. Diagnoses of Obstructive sleep apnea syndrome and Gastroesophageal reflux disease without esophagitis were pertinent to this visit. The chronic medical conditions listed are directly related to the primary diagnosis listed above. The management of the primary diagnosis affects the secondary diagnosis and vice versa. Subjective:   Subjective   Patient ID: Charlotte Salcido is a 77 y.o. male.     Chief Complaint   Patient presents with    Sleep Apnea       HPI:  Machine Modem/Download Info:  Compliance (hours/night): 4.5 hrs/night  % of nights >= 4 hrs: 73 %  Download AHI (/hour): 5.6 /HR  Average CPAP Pressure : 11 cmH2O   APAP - Settings  Pressure Min: 9 cmH2O  Pressure Max: 16 cmH2O                 Comfort Settings  Humidity Level (0-8): 5  Heated Tubing (Yes/No): Yes  Flex/EPR (0-3): 3       Did finally get a replaced machine as well as a full facemask. Since then the dryness is much better and he is not running out of water. He is having some issues with a small sore this morning in the corner of his right lip and he often has to keep the mask off for a few days or take it off middle the night. The mask was checked in the office and appears to be too narrow and the sizing appears to be off. Sperry - Total score: 5    Social History     Socioeconomic History    Marital status:      Spouse name: Xuan Mcleod Number of children: 2    Years of education: Not on file    Highest education level: Not on file   Occupational History    Occupation:      Comment: [employer]   Tobacco Use    Smoking status: Never Smoker    Smokeless tobacco: Never Used    Tobacco comment: never   Substance and Sexual Activity    Alcohol use: No     Comment: none    Drug use: No     Comment: none    Sexual activity: Never     Partners: Female   Other Topics Concern    Not on file   Social History Narrative    Not on file     Social Determinants of Health     Financial Resource Strain:     Difficulty of Paying Living Expenses: Not on file   Food Insecurity:     Worried About Running Out of Food in the Last Year: Not on file    Leslie of Food in the Last Year: Not on file   Transportation Needs:     Lack of Transportation (Medical): Not on file    Lack of Transportation (Non-Medical):  Not on file   Physical Activity:     Days of Exercise per Week: Not on file    Minutes of Exercise per Session: Not on file   Stress:     Feeling of Stress : Not on file   Social Connections:     Frequency of Communication with Friends and Family: Not on file    Frequency of Social Gatherings with Friends and Family: Not on file    Attends Jew Services: Not on file    Active Member of Clubs or Organizations: Not on file    Attends Club or Organization Meetings: Not on file    Marital Status: Not on file   Intimate Partner Violence:     Fear of Current or Ex-Partner: Not on file    Emotionally Abused: Not on file    Physically Abused: Not on file    Sexually Abused: Not on file   Housing Stability:     Unable to Pay for Housing in the Last Year: Not on file    Number of [place] in the Last Year: Not on file    Unstable Housing in the Last Year: Not on file       Current Outpatient Medications   Medication Instructions    aspirin 81 mg, Oral, DAILY    atorvastatin (LIPITOR) 10 MG tablet Take 1 tablet by mouth once daily    Calcium Carb-Cholecalciferol (CALCIUM PLUS VITAMIN D3) 600-500 MG-UNIT CAPS 1 tablet, DAILY    Omega-3 Fatty Acids (FISH OIL) 1200 MG CAPS 1 capsule, Oral, DAILY    pantoprazole (PROTONIX) 40 MG tablet TAKE 1 TABLET BY MOUTH ONCE DAILY    Saw Palmetto, Serenoa repens, 450 MG CAPS 1 capsule, DAILY    therapeutic multivitamin-minerals (THERAGRAN-M) tablet 1 tablet, DAILY          Vitals:  Weight BMI   Wt Readings from Last 3 Encounters:   03/07/22 170 lb (77.1 kg)   01/13/22 174 lb (78.9 kg)   12/07/21 177 lb (80.3 kg)    Body mass index is 23.71 kg/m².      BP HR SaO2   BP Readings from Last 3 Encounters:   03/07/22 138/82   12/07/21 130/80   07/06/21 117/76    Pulse Readings from Last 3 Encounters:   03/07/22 71   12/07/21 83   07/06/21 73    SpO2 Readings from Last 3 Encounters:   03/07/22 97%   12/07/21 98%   02/22/21 100%        Electronically signed by Yovany Elias MD on 3/7/2022 at 12:22 PM

## 2022-03-07 NOTE — ASSESSMENT & PLAN NOTE
Chronic-Stable: Reviewed and analyzed results of physiologic download from patient's machine and reviewed with patient. Supplies and parts as needed for his machine. These are medically necessary. Limit caffeine use after 3pm. Based on the analyzed data will continue with current settings. Needs to contact his DME company and have a mask refit, he most likely would benefit from a wide insert. Also discussed possibly of a mask liner.

## 2022-03-24 ENCOUNTER — TELEPHONE (OUTPATIENT)
Dept: PULMONOLOGY | Age: 67
End: 2022-03-24

## 2022-03-24 NOTE — TELEPHONE ENCOUNTER
Pt received wrong masks from [employer] while trying to get wider mask. He stated he was sent 3 of the same mask he had. Pt states he is going back to using the nosepillow instead of a full facial mask due to issues with [employer]. Pt needs a medium wide full facial mask. Please contact pt at [phone].

## 2022-05-23 DIAGNOSIS — E78.00 PURE HYPERCHOLESTEROLEMIA: ICD-10-CM

## 2022-05-23 RX ORDER — ATORVASTATIN CALCIUM 10 MG/1
TABLET, FILM COATED ORAL
Qty: 90 TABLET | Refills: 0 | Status: SHIPPED | OUTPATIENT
Start: 2022-05-23 | End: 2022-07-07 | Stop reason: SDUPTHER

## 2022-05-23 NOTE — TELEPHONE ENCOUNTER
Pt called requesting a refill on this medication  atorvastatin (LIPITOR) 10 MG tablet 90 day supply      Ph.   [phone]

## 2022-06-28 ENCOUNTER — TELEPHONE (OUTPATIENT)
Dept: PULMONOLOGY | Age: 67
End: 2022-06-28

## 2022-06-28 NOTE — TELEPHONE ENCOUNTER
Patient sent this message as a MyChart canceled appointment:    Haven't been on [facility] since cancelling appointment, so late responding. I called [facility] after Dr Brad Burton ordered a medium/wide mask. He said he would contact [facility] but that I should too. Mary Gentile did, took 3 calls, but finally got someone. Went over the change and the [facility] rep said I should order one of the new masks first, since Medicare only covers so many, and I wouldn't be stuck with masks I couldn't use. Said okay, waited, received my new mask, only it wasn't what Dr Brad Burton prescribed. They sent me 3 of the masks I had been using. I called your office and was told I should handle through [employer]. [facility] then [facility] and went over all of it with them in person. They said okay, we'll get you the correct mask. Meanwhile they gave me another brand to try while waiting. That's when I called your office and said I was set.  The temporary mask lasted 2 nights, then blew air out in all directions. The correct mask never showed up. I'm fed up. Cancelled appointment with Dr Brad Burton and had [facility] take me out of their system, including the charge for monitoring. The machine is paid for and mine. Maybe I can use it someday with the correct equipment. Called patient and discussed issues of getting mask that fits correctly. Explained to patient that the best way to get a mask would be to go in-person and get it fitted with the machine. Told patient an order could be sent to another [employer] company. Patient said he would think about it and call us back.

## 2022-07-07 ENCOUNTER — OFFICE VISIT (OUTPATIENT)
Dept: PRIMARY CARE CLINIC | Age: 67
End: 2022-07-07
Payer: MEDICARE

## 2022-07-07 VITALS
BODY MASS INDEX: 24.11 KG/M2 | OXYGEN SATURATION: 97 % | DIASTOLIC BLOOD PRESSURE: 84 MMHG | HEART RATE: 75 BPM | WEIGHT: 172.2 LBS | SYSTOLIC BLOOD PRESSURE: 134 MMHG | HEIGHT: 71 IN

## 2022-07-07 DIAGNOSIS — Z23 NEED FOR PROPHYLACTIC VACCINATION AND INOCULATION AGAINST VARICELLA: ICD-10-CM

## 2022-07-07 DIAGNOSIS — H91.93 DECREASED HEARING OF BOTH EARS: Primary | ICD-10-CM

## 2022-07-07 DIAGNOSIS — Z00.00 MEDICARE ANNUAL WELLNESS VISIT, SUBSEQUENT: ICD-10-CM

## 2022-07-07 DIAGNOSIS — E78.00 PURE HYPERCHOLESTEROLEMIA: ICD-10-CM

## 2022-07-07 DIAGNOSIS — Z12.5 PROSTATE CANCER SCREENING: ICD-10-CM

## 2022-07-07 DIAGNOSIS — Z23 NEED FOR PNEUMOCOCCAL VACCINATION: ICD-10-CM

## 2022-07-07 DIAGNOSIS — Z12.11 COLON CANCER SCREENING: ICD-10-CM

## 2022-07-07 PROCEDURE — 1123F ACP DISCUSS/DSCN MKR DOCD: CPT | Performed by: INTERNAL MEDICINE

## 2022-07-07 PROCEDURE — 3017F COLORECTAL CA SCREEN DOC REV: CPT | Performed by: INTERNAL MEDICINE

## 2022-07-07 PROCEDURE — G0439 PPPS, SUBSEQ VISIT: HCPCS | Performed by: INTERNAL MEDICINE

## 2022-07-07 PROCEDURE — G0009 ADMIN PNEUMOCOCCAL VACCINE: HCPCS | Performed by: INTERNAL MEDICINE

## 2022-07-07 PROCEDURE — 90732 PPSV23 VACC 2 YRS+ SUBQ/IM: CPT | Performed by: INTERNAL MEDICINE

## 2022-07-07 RX ORDER — ATORVASTATIN CALCIUM 10 MG/1
TABLET, FILM COATED ORAL
Qty: 90 TABLET | Refills: 1 | Status: SHIPPED | OUTPATIENT
Start: 2022-07-07

## 2022-07-07 ASSESSMENT — PATIENT HEALTH QUESTIONNAIRE - PHQ9
SUM OF ALL RESPONSES TO PHQ QUESTIONS 1-9: 0
SUM OF ALL RESPONSES TO PHQ QUESTIONS 1-9: 0
SUM OF ALL RESPONSES TO PHQ9 QUESTIONS 1 & 2: 0
2. FEELING DOWN, DEPRESSED OR HOPELESS: 0
1. LITTLE INTEREST OR PLEASURE IN DOING THINGS: 0
SUM OF ALL RESPONSES TO PHQ QUESTIONS 1-9: 0
SUM OF ALL RESPONSES TO PHQ QUESTIONS 1-9: 0

## 2022-07-07 ASSESSMENT — LIFESTYLE VARIABLES: HOW OFTEN DO YOU HAVE A DRINK CONTAINING ALCOHOL: NEVER

## 2022-07-07 NOTE — PROGRESS NOTES
Pt here for AWV and has c/o, no other issues at this time    Wants to see Audio for hearing loss      Pt needs to complete the following. ..   Colorectal Cancer Screen  Pneumovax 23  Shingrix  PSA levels      Most recent labs from Feb 2022

## 2022-07-07 NOTE — PROGRESS NOTES
Here for his Annual Medicare Wellness visit again today-pt wants to continue annual AMW visits in future  Wants to get a hearing aid so wants Audiology referral today  Wants labs ordered again now and in 6 months  Lab Results   Component Value Date    PSA 2.5 06/12/2020    PSA 2.8 09/20/2019    PSA 3.6 09/05/2018   FULLY VACCINATED FOR COVID  NEEDS PNEUMOVAX 23 today  Wants refills Lipitor 10 refills today  Consider Shingrix vaccination series of 2 shots over 2-6 months if desired for protection from shingles-check with INS first re: coverage before beginning series  Depression screen today  next colon due 2027 with [facility] for his IBS    Son's heart was enlarged due to LAD blocked age 40  Pt wants another sleep study done due to son's death, not using his CPAP, saw Dr. Akilah Marley of all CPAP masks / Jerzy Jon, sees him in March 2022  [address] sent me to a different equipment provider    Medicare Annual Wellness Visit    Ludy Lazo is here for Medicare AWV and Hyperlipidemia    Assessment & Plan   Decreased hearing of both ears  -     [facility]  Prostate cancer screening  -     PSA Screening; Future  Pure hypercholesterolemia  -     CBC with Auto Differential; Future  -     Comprehensive Metabolic Panel, Fasting; Future  -     Lipid, Fasting; Future  -     CBC with Auto Differential; Future  -     Comprehensive Metabolic Panel, Fasting; Future  -     Lipid, Fasting; Future  -     atorvastatin (LIPITOR) 10 MG tablet; One daily, Disp-90 tablet, R-1Normal  Need for pneumococcal vaccination  -     Pneumococcal, PPSV23, PNEUMOVAX 23, (age 2 yrs+), SC/IM  Colon cancer screening      Recommendations for Preventive Services Due: see orders and patient instructions/AVS.  Recommended screening schedule for the next 5-10 years is provided to the patient in written form: see Patient Instructions/AVS.     No follow-ups on file.      Subjective   The following acute and/or chronic problems were also addressed today:  No issues    Patient's complete Health Risk Assessment and screening values have been reviewed and are found in Flowsheets. The following problems were reviewed today and where indicated follow up appointments were made and/or referrals ordered.     Positive Risk Factor Screenings with Interventions:             General Health and ACP:  General  In general, how would you say your health is?: Very Good  In the past 7 days, have you experienced any of the following: New or Increased Pain, New or Increased Fatigue, Loneliness, Social Isolation, Stress or Anger?: No  Do you get the social and emotional support that you need?: Yes  Do you have a Living Will?: Yes    Advance Directives     Power of  Living Will ACP-Advance Directive ACP-Power of     Not on File Not on File Not on File Not on File      General Health Risk Interventions:  · Poor self-assessment of health status: no issues     Hearing/Vision:  Do you or your family notice any trouble with your hearing that hasn't been managed with hearing aids?: (!) Yes  Do you have difficulty driving, watching TV, or doing any of your daily activities because of your eyesight?: No  Have you had an eye exam within the past year?: Yes  No exam data present    Hearing/Vision Interventions:  · Hearing concerns:  audiology referral provided    Safety:  Do you have working smoke detectors?: Yes  Do you have any tripping hazards - loose or unsecured carpets or rugs?: No  Do you have any tripping hazards - clutter in doorways, halls, or stairs?: No  Do you have either shower bars, grab bars, non-slip mats or non-slip surfaces in your shower or bathtub?: (!) No  Do all of your stairways have a railing or banister?: Yes  Do you always fasten your seatbelt when you are in a car?: Yes    Safety Interventions:  · Home safety tips provided           Objective   Vitals:    07/07/22 1008   BP: 134/84   Site: Left Upper Arm   Position: Sitting   Cuff Size: Medium Adult   Pulse: 75   SpO2: 97%   Weight: 172 lb 3.2 oz (78.1 kg)   Height: 5' 11\" (1.803 m)      Body mass index is 24.02 kg/m². General Appearance: alert and oriented to person, place and time, well developed and well- nourished, in no acute distress  Skin: warm and dry, no rash or erythema  Head: normocephalic and atraumatic  Eyes: pupils equal, round, and reactive to light, extraocular eye movements intact, conjunctivae normal  ENT: tympanic membrane, external ear and ear canal normal bilaterally, nose without deformity, nasal mucosa and turbinates normal without polyps  Neck: supple and non-tender without mass, no thyromegaly or thyroid nodules, no cervical lymphadenopathy  Pulmonary/Chest: clear to auscultation bilaterally- no wheezes, rales or rhonchi, normal air movement, no respiratory distress  Cardiovascular: normal rate, regular rhythm, normal S1 and S2, no murmurs, rubs, clicks, or gallops, distal pulses intact, no carotid bruits  Abdomen: soft, non-tender, non-distended, normal bowel sounds, no masses or organomegaly  Extremities: no cyanosis, clubbing or edema  Musculoskeletal: normal range of motion, no joint swelling, deformity or tenderness  Neurologic: reflexes normal and symmetric, no cranial nerve deficit, gait, coordination and speech normal       No Known Allergies  Prior to Visit Medications    Medication Sig Taking? Authorizing Provider   atorvastatin (LIPITOR) 10 MG tablet One daily Yes Ziggy Orr MD   aspirin 81 MG EC tablet Take 81 mg by mouth daily Yes Historical Provider, MD   pantoprazole (PROTONIX) 40 MG tablet TAKE 1 TABLET BY MOUTH ONCE DAILY Yes Historical Provider, MD   Omega-3 Fatty Acids (FISH OIL) 1200 MG CAPS Take 1 capsule by mouth daily Yes Historical Provider, MD   therapeutic multivitamin-minerals (THERAGRAN-M) tablet Take 1 tablet by mouth daily.    Yes Historical Provider, MD   Calcium Carb-Cholecalciferol follow up plan.   Time spent (minutes): 5

## 2022-07-07 NOTE — PATIENT INSTRUCTIONS
Consider Shingrix vaccination series of 2 shots over 2-6 months if desired for protection from shingles-check with [employer] first re: coverage before beginning series    Schedule colonoscopy with GI for colon CA screening       Advance Directives: Care Instructions  Overview  An advance directive is a legal way to state your wishes at the end of your life. It tells your family and your doctor what to do if you can't say what youwant. There are two main types of advance directives. You can change them any timeyour wishes change. Living will. This form tells your family and your doctor your wishes about life support and other treatment. The form is also called a declaration. Medical power of . This form lets you name a person to make treatment decisions for you when you can't speak for yourself. This person is called a health care agent (health care proxy, health care surrogate). The form is also called a durable power of  for health care. If you do not have an advance directive, decisions about your medical care maybe made by a family member, or by a doctor or a  who doesn't know you. It may help to think of an advance directive as a gift to the people who carefor you. If you have one, they won't have to make tough decisions by themselves. Follow-up care is a key part of your treatment and safety. Be sure to make and go to all appointments, and call your doctor if you are having problems. It's also a good idea to know your test results and keep alist of the medicines you take. What should you include in an advance directive? Many states have a unique advance directive form. (It may ask you to address specific issues.) Or you might use a universal form that's approved by manystates. If your form doesn't tell you what to address, it may be hard to know what to include in your advance directive. Use the questions below to help you getstarted.    Who do you want to make decisions about your medical care if you are not able to?  What life-support measures do you want if you have a serious illness that gets worse over time or can't be cured?  What are you most afraid of that might happen? (Maybe you're afraid of having pain, losing your independence, or being kept alive by machines.)   Where would you prefer to die? (Your home? A hospital? A nursing home?)   Do you want to donate your organs when you die?  Do you want certain Confucianism practices performed before you die? When should you call for help? Be sure to contact your doctor if you have any questions. Where can you learn more? Go to [email or web address] and sign in to your [email or web address] account. Enter R264 in the [employer] box to learn more about \"Advance Directives: Care Instructions. \"     If you do not have an account, please click on the \"Sign Up Now\" link. Current as of: October 18, 2021               Content Version: 13.3  © 2006-2022 [employer]. Care instructions adapted under license by [facility] ([facility]). If you have questions about a medical condition or this instruction, always ask your healthcare professional. [place] [address] any warranty or liability for your use of this information. Learning About Medical Power of   What is a medical power of ? A medical power of , also called a durable power of  for health care, is one type of the legal forms called advance directives. It lets you name the person you want to make treatment decisions for you if you can't speak or decide for yourself. The person you choose is called your health care agent. This person is also called a health care proxy or health care surrogate. A medical power of  may be called something else in your state. How do you choose a health care agent? Choose your health care agent carefully. This person may or may not be a familymember.   Talk to the person before you make your final decision. Make sure he or she iscomfortable with this responsibility. It's a good idea to choose someone who:   Is at least 25years old.  Knows you well and understands what makes life meaningful for you.  Understands your Gnosticist and moral values.  Will do what you want, not what he or she wants.  Will be able to make difficult choices at a stressful time.  Will be able to refuse or stop treatment, if that is what you would want, even if you could die.  Will be firm and confident with health professionals if needed.  Will ask questions to get needed information.  Lives near you or agrees to travel to you if needed. Your family may help you make medical decisions while you can still be part of that process. But it's important to choose one person to be your health careagent in case you aren't able to make decisions for yourself. If you don't fill out the legal form and name a health care agent, thedecisions your family can make may be limited. A health care agent may be called something else in your state. Who will make decisions for you if you don't have a health care agent? If you don't have a health care agent or a living will, you may not get the care you want. Decisions may be made by family members who disagree about your medical care. Or decisions may be made by a medical professional who doesn'tknow you well. In some cases, a  makes the decisions. When you name a health care agent, it is very clear who has the power to [place] decisions for you. How do you name a health care agent? You name your health care agent on a legal form. This form is usually called a medical power of . Ask your hospital, state bar association, or officeon aging where to find these forms. You must sign the form to make it legal. Some states require you to get the form notarized.  This means that a person called a  watches you sign the form and your doctor so you know what might be done if you can't breathe on your own, your heart stops, or you can't swallow.  What things would you still want to be able to do after you receive life-support methods? Would you want to be able to walk? To speak? To eat on your own? To live without the help of machines?  Do you want certain Cheondoism practices performed if you become very ill?  If you have a choice, where do you want to be cared for? In your home? At a hospital or nursing home?  If you have a choice at the end of your life, where would you prefer to die? At home? In a hospital or nursing home? Somewhere else?  Would you prefer to be buried or cremated?  Do you want your organs to be donated after you die? What should you do with your living will?  Make sure that your family members and your health care agent have copies of your living will (also called a declaration).  Give your doctor a copy of your living will. Ask to have it kept as part of your medical record. If you have more than one doctor, make sure that each one has a copy.  Put a copy of your living will where it can be easily found. For example, some people may put a copy on their refrigerator door. If you are using a digital copy, be sure your doctor, family members, and health care agent know how to find and access it. Where can you learn more? Go to [email or web address] and sign in to your [employer] account. Enter U771 in the [facility] box to learn more about \"Learning About Living Vivian. \"     If you do not have an account, please click on the \"Sign Up Now\" link. Current as of: October 18, 2021               Content Version: 13.3  © 8871-2954 [employer]. Care instructions adapted under license by [facility] ([facility]).  If you have questions about a medical condition or this instruction, always ask your healthcare professional. Kat Liang disclaims any warranty or liability for your use of this information. Personalized Preventive Plan for Liban Potter - 7/7/2022  Medicare offers a range of preventive health benefits. Some of the tests and screenings are paid in full while other may be subject to a deductible, co-insurance, and/or copay. Some of these benefits include a comprehensive review of your medical history including lifestyle, illnesses that may run in your family, and various assessments and screenings as appropriate. After reviewing your medical record and screening and assessments performed today your provider may have ordered immunizations, labs, imaging, and/or referrals for you. A list of these orders (if applicable) as well as your Preventive Care list are included within your After Visit Summary for your review. Other Preventive Recommendations:    · A preventive eye exam performed by an eye specialist is recommended every 1-2 years to screen for glaucoma; cataracts, macular degeneration, and other eye disorders. · A preventive dental visit is recommended every 6 months. · Try to get at least 150 minutes of exercise per week or 10,000 steps per day on a pedometer . · Order or download the FREE \"Exercise & Physical Activity: Your Everyday Guide\" from [employer]. Call 7-450.878.2691 or search [employer] online. · You need 9012-8552 mg of calcium and 1203-5142 IU of vitamin D per day. It is possible to meet your calcium requirement with diet alone, but a vitamin D supplement is usually necessary to meet this goal.  · When exposed to the sun, use a sunscreen that protects against both UVA and UVB radiation with an SPF of 30 or greater. Reapply every 2 to 3 hours or after sweating, drying off with a towel, or swimming. · Always wear a seat belt when traveling in a car. Always wear a helmet when riding a bicycle or motorcycle.

## 2022-07-23 NOTE — PROGRESS NOTES
Here today for his Annual Medicare Wellness visit  Due for Shingrix and Pneum-23  Wants his EKG done today for his Welcome to Medicare visit, had Normal GXT 2021-defer EKG today    Stool FIT sent with patient, I want another colonoscopy since Im on Medicare now, he told me I didn't need another one for 5 more years  My bowels are loose  THIS ALL STARTED BEFORE I WENT ON THE PROTONIX  Wants results of recent labs printed today  Defer to GI Samantha Carrion    Free testosterone elevated 2021 denies any supplements  Needs refills atorvastatin  Has virtual visit with Samantha Carrion end of this month  We already have a Living Will  I went on that CPAP machine  My BP is much better today  I saw Gmvida in 2020 and I want to see him again but I want a PSA done before my next visit with him        Medicare Annual Wellness Visit  Name: Samm Wayne Date: 2021   MRN: [id number] Sex: Male   Age: 72 y.o. Ethnicity: Non-/Non    : 1955 Race: Yair Cervantes is here for Medicare AWV    Screenings for behavioral, psychosocial and functional/safety risks, and cognitive dysfunction are all negative except as indicated below. These results, as well as other patient data from the [address] form, are documented in Flowsheets linked to this Encounter. No Known Allergies    Prior to Visit Medications    Medication Sig Taking? Authorizing Provider   atorvastatin (LIPITOR) 10 MG tablet Take 1 tablet by mouth once daily Yes Anabell Whittington MD   pantoprazole (PROTONIX) 40 MG tablet TAKE 1 TABLET BY MOUTH ONCE DAILY Yes Historical Provider, MD   Omega-3 Fatty Acids (FISH OIL) 1200 MG CAPS Take 1 capsule by mouth daily Yes Historical Provider, MD   aspirin 81 MG chewable tablet Take 81 mg by mouth daily. Yes Historical Provider, MD   therapeutic multivitamin-minerals (THERAGRAN-M) tablet Take 1 tablet by mouth daily.    Yes Historical Provider, MD   Calcium Carb-Cholecalciferol (CALCIUM PLUS VITAMIN D3) 600-500 MG-UNIT CAPS Take 1 tablet by mouth daily. Yes Historical Provider, MD Parish Quach Sersera yostns, 450 MG CAPS Take 1 capsule by mouth daily. Yes Historical Provider, MD       Past Medical History:   Diagnosis Date    GERD (gastroesophageal reflux disease) 10/5/2012    Hyperlipidemia     Obstructive sleep apnea syndrome 7/1/2021    Pure hypercholesterolemia 10/4/2012       Past Surgical History:   Procedure Laterality Date   [address] and 2003    cataract both eyes    NECK SURGERY  2009    [facility] History   Problem Relation Age of Onset    Depression Mother     Vision Loss Mother     Diabetes Father     Hearing Loss Father     Heart Disease Father     High Cholesterol Father     Vision Loss Father     Cancer Paternal Uncle     Stroke Maternal Grandfather     Mult Sclerosis Sister     Sleep Apnea Son        Silvio (Including outside providers/suppliers regularly involved in providing care):   Patient Care Team:  Brielle Gee MD as PCP - John Cole MD as PCP - [facility] Empaneled Provider    Wt Readings from Last 3 Encounters:   07/06/21 172 lb 9.6 oz (78.3 kg)   02/22/21 173 lb (78.5 kg)   06/19/20 167 lb (75.8 kg)     Vitals:    07/06/21 1013   BP: 117/76   Site: Left Upper Arm   Position: Sitting   Cuff Size: Large Adult   Pulse: 73   Weight: 172 lb 9.6 oz (78.3 kg)   Height: 5' 10\" (1.778 m)     Body mass index is 24.77 kg/m². Based upon direct observation of the patient, evaluation of cognition reveals recent and remote memory intact.     General Appearance: alert and oriented to person, place and time, well developed and well- nourished, in no acute distress  Skin: warm and dry, no rash or erythema  Head: normocephalic and atraumatic  Eyes: pupils equal, round, and reactive to light, extraocular eye movements intact, conjunctivae normal  ENT: tympanic membrane, external ear and ear canal normal bilaterally, nose without deformity, nasal mucosa and turbinates normal without polyps  Neck: supple and non-tender without mass, no thyromegaly or thyroid nodules, no cervical lymphadenopathy  Pulmonary/Chest: clear to auscultation bilaterally- no wheezes, rales or rhonchi, normal air movement, no respiratory distress  Cardiovascular: normal rate, regular rhythm, normal S1 and S2, no murmurs, rubs, clicks, or gallops, distal pulses intact, no carotid bruits  Abdomen: soft, non-tender, non-distended, normal bowel sounds, no masses or organomegaly  Extremities: no cyanosis, clubbing or edema  Musculoskeletal: normal range of motion, no joint swelling, deformity or tenderness  Neurologic: reflexes normal and symmetric, no cranial nerve deficit, gait, coordination and speech normal    Patient's complete Health Risk Assessment and screening values have been reviewed and are found in Flowsheets. The following problems were reviewed today and where indicated follow up appointments were made and/or referrals ordered. Positive Risk Factor Screenings with Interventions:          General Health and ACP:  General  In general, how would you say your health is?: Good  In the past 7 days, have you experienced any of the following?  New or Increased Pain, New or Increased Fatigue, Loneliness, Social Isolation, Stress or Anger?: None of These  Do you get the social and emotional support that you need?: Yes  Do you have a Living Will?: Yes  Advance Directives     Power of [address] Will ACP-Advance Directive ACP-Power of     Not on File Not on File Not on File Not on File      General Health Risk Interventions:  · no issues     Hearing/Vision:  No exam data present  Hearing/Vision  Do you or your family notice any trouble with your hearing that hasn't been managed with hearing aids?: (!) Yes (could be better but not ready for hearing aids)  Do you have difficulty driving, Differential (performable only)   Result Value Ref Range    WBC 17.5 (H) 4.2 - 11.0 K/mcL    RBC 5.37 (H) 4.00 - 5.20 mil/mcL    HGB 14.5 12.0 - 15.5 g/dL    HCT 42.7 36.0 - 46.5 %    MCV 79.5 78.0 - 100.0 fl    MCH 27.0 26.0 - 34.0 pg    MCHC 34.0 32.0 - 36.5 g/dL    RDW-CV 13.4 11.0 - 15.0 %    RDW-SD 37.9 (L) 39.0 - 50.0 fL     140 - 450 K/mcL    NRBC 0 <=0 /100 WBC    Neutrophil, Percent 62 %    Lymphocytes, Percent 29 %    Mono, Percent 7 %    Eosinophils, Percent 0 %    Basophils, Percent 1 %    Immature Granulocytes 1 %    Absolute Neutrophils 11.0 (H) 1.8 - 7.7 K/mcL    Absolute Lymphocytes 5.1 (H) 1.0 - 4.8 K/mcL    Absolute Monocytes 1.2 (H) 0.3 - 0.9 K/mcL    Absolute Eosinophils  0.0 0.0 - 0.5 K/mcL    Absolute Basophils 0.1 0.0 - 0.3 K/mcL    Absolute Immmature Granulocytes 0.1 0.0 - 0.2 K/mcL   Magnesium   Result Value Ref Range    Magnesium 1.7 1.7 - 2.4 mg/dL   Creatine Kinase   Result Value Ref Range    CK 81 26 - 192 Units/L   Alcohol   Result Value Ref Range    Alcohol None Detected None Detected mg/dL   Drug Abuse Screen, Urine   Result Value Ref Range    Amphetamines, Urine Positive (A) Negative    Barbiturates, Urine Negative Negative    Benzodiazepines, Urine Positive (A) Negative    Cocaine/ Metabolite, Urine Negative Negative    Opiates, Urine Positive (A) Negative    Phencyclidine, Urine Negative Negative    Cannabinoids, Urine Negative Negative   Salicylate Level   Result Value Ref Range    Salicylate <2.8 <=30.0 mg/dL   Acetaminophen Level   Result Value Ref Range    Acetaminophen <2 (L) 10 - 30 mcg/mL   Rapid SARS-CoV-2 by PCR    Specimen: Nasal, Mid-turbinate; Swab   Result Value Ref Range    Rapid SARS-COV-2 by PCR Not Detected Not Detected / Detected / Presumptive Positive / Inhibitors present    Isolation Guidelines      Procedural Comment     HCG POC   Result Value Ref Range    HCG, URINE - POINT OF CARE Negative Negative       Imaging Results          XR CHEST PA AND LATERAL  watching TV, or doing any of your daily activities because of your eyesight?: No  Have you had an eye exam within the past year?: Yes  Hearing/Vision Interventions:  · no issues      Personalized Preventive Plan   Current Health Maintenance Status  Immunization History   Administered Date(s) Administered    COVID-19, Natalio Santos PF, 30mcg/0.3mL 03/04/2021, 03/25/2021    Influenza Vaccine, unspecified formulation 11/03/2016    Influenza Virus Vaccine 10/20/2015, 10/28/2017, 11/02/2018    Influenza, Quadv, IM, PF (6 mo and older Fluzone, Flulaval, Fluarix, and 3 yrs and older Afluria) 09/25/2019    Pneumococcal Conjugate 13-valent (Mardel Meir) 10/22/2020    Tdap (Boostrix, Adacel) 10/10/2014        Health Maintenance   Topic Date Due    Shingles Vaccine (1 of 2) Never done    Colon Cancer Screen FIT/FOBT  12/15/2016    Annual Wellness Visit (AWV)  Never done    Flu vaccine (1) 09/01/2021    Pneumococcal 65+ years Vaccine (2 of 2 - PPSV23) 10/22/2021    Lipid screen  07/01/2022    DTaP/Tdap/Td vaccine (2 - Td or Tdap) 10/10/2024    COVID-19 Vaccine  Completed    Hepatitis C screen  Completed    HIV screen  Completed    Hepatitis A vaccine  Aged Out    Hepatitis B vaccine  Aged Out    Hib vaccine  Aged Out    Meningococcal (ACWY) vaccine  Aged Out     Recommendations for "[employer] Due: see orders and patient instructions/AVS.  . Recommended screening schedule for the next 5-10 years is provided to the patient in written form: see Patient Instructions/AVS.    Trudy Hines was seen today for medicare awv. Diagnoses and all orders for this visit:    Colon cancer screening    Pure hypercholesterolemia                 Advance Care Planning   Advanced Care Planning: Discussed the patients choices for care and treatment in case of a health event that adversely affects decision-making abilities. Also discussed the patients long-term treatment options.  Reviewed with the patient the CHICHI KIMBLE 2 VIEWS (Final result)  Result time 07/23/22 06:28:07    Final result                 Impression:    Impression:   No acute cardiopulmonary findings.    Electronically Signed by: SANTANA JARAMILLO MD   Signed on: 7/23/2022 6:28 AM                Narrative:    History: Chest Pain    Exam: XR CHEST PA AND LATERAL 2 VIEWS.     Comparison: None.    Findings: Two views of the chest demonstrate normal cardiomediastinal  silhouette. No focal infiltrate. No definite pneumothorax. No acute or  aggressive bony abnormalities.                                EKG:   I personally reviewed patient's EKG, sinus tachycardia, rate 109, normal intervals, nonischemic    Vitals:    07/23/22 0420 07/23/22 0647 07/23/22 0702   BP: (!) 145/86 106/76    Pulse: (!) 118 89    Resp: 18 (!) 26    Temp: 98.1 °F (36.7 °C)     TempSrc: Oral     SpO2: 100%  100%       ED Medication Orders (From admission, onward)    Ordered Start     Status Ordering Provider    07/23/22 0518 07/23/22 0519  lactated ringers bolus 1,000 mL  ONCE         Last MAR action: Completed NATALIE CARTWRIGHT    07/23/22 0518 07/23/22 0519  ondansetron (ZOFRAN) injection 4 mg  ONCE         Last MAR action: Given NATALIE CARTWRIGHT    07/23/22 0518 07/23/22 0519  famotidine (PEPCID) injection 20 mg  ONCE         Last MAR action: Given NATALIE CARTWRIGHT          MDM: Patient is a 25-year-old female with history of reactive airway disease presenting after medical misadventure.  Patient admits to 2 Adderall tablets that she states for immediate release, however she is still having symptoms 28 hours later.  Unclear based on history whether patient took other substances that may be causing sympathetic toxidrome.  Will discuss with poison center to further clarify neck steps.  Will maintain on telemetry and begin treatment with LR, Zofran.  At this time symptomology does not require treatment with benzodiazepines, will monitor closely and consider as needed dosing.    Discussed with poison  [place], case #[id number].  MURIEL Wren DO [address] [address]  [phone]      [facility] EMERGENCY Emergency Medicine  RETURN TO THE ED IMMEDIATELY IF DEVELOPING ANY NEW OR WORSENING SYMPTOMS [address] [address] [phone]    Dylon Barfield MD  [facility] EM PGY-2  [phone]     Prashanth Barfield MD  Resident  07/23/22 5273

## 2022-08-05 DIAGNOSIS — Z85.828 FOLLOW-UP SURVEILLANCE OF SKIN CANCER, ENCOUNTER FOR: Primary | ICD-10-CM

## 2022-08-05 DIAGNOSIS — Z08 FOLLOW-UP SURVEILLANCE OF SKIN CANCER, ENCOUNTER FOR: Primary | ICD-10-CM

## 2022-08-10 LAB
A/G RATIO: 2.6 (ref 1.2–2.2)
ALBUMIN SERPL-MCNC: 4.2 G/DL (ref 3.8–4.8)
ALP BLD-CCNC: 52 IU/L (ref 44–121)
ALT SERPL-CCNC: 20 IU/L (ref 0–44)
AST SERPL-CCNC: 32 IU/L (ref 0–40)
BASOPHILS ABSOLUTE: 0 X10E3/UL (ref 0–0.2)
BASOPHILS RELATIVE PERCENT: 1 %
BILIRUB SERPL-MCNC: 1.3 MG/DL (ref 0–1.2)
BUN / CREAT RATIO: 19 (ref 10–24)
BUN BLDV-MCNC: 23 MG/DL (ref 8–27)
CALCIUM SERPL-MCNC: 9.6 MG/DL (ref 8.6–10.2)
CHLORIDE BLD-SCNC: 103 MMOL/L (ref 96–106)
CHOLESTEROL, TOTAL: 136 MG/DL (ref 100–199)
CO2: 27 MMOL/L (ref 20–29)
COMMENT: NORMAL
CREAT SERPL-MCNC: 1.24 MG/DL (ref 0.76–1.27)
EOSINOPHILS ABSOLUTE: 0.2 X10E3/UL (ref 0–0.4)
EOSINOPHILS RELATIVE PERCENT: 4 %
ERYTHROCYTES, NUCLEATED/100 LEU: NORMAL
ESTIMATED GLOMERULAR FILTRATION RATE CREATININE EQUATION: 64 ML/MIN/1.73
GLOBULIN: 1.6 G/DL (ref 1.5–4.5)
GLUCOSE BLD-MCNC: 87 MG/DL (ref 65–99)
HCT VFR BLD CALC: 42.5 % (ref 37.5–51)
HDLC SERPL-MCNC: 60 MG/DL
HEMOGLOBIN: 14.1 G/DL (ref 13–17.7)
IMMATURE CELLS ABSOLUTE COUNT: NORMAL
IMMATURE GRANS (ABS): 0 X10E3/UL (ref 0–0.1)
IMMATURE GRANULOCYTES: 0 %
LDL CHOLESTEROL CALCULATED: 63 MG/DL (ref 0–99)
LYMPHOCYTES ABSOLUTE: 1.4 X10E3/UL (ref 0.7–3.1)
LYMPHOCYTES RELATIVE PERCENT: 33 %
MCH RBC QN AUTO: 30.9 PG (ref 26.6–33)
MCHC RBC AUTO-ENTMCNC: 33.2 G/DL (ref 31.5–35.7)
MCV RBC AUTO: 93 FL (ref 79–97)
MONOCYTES ABSOLUTE: 0.4 X10E3/UL (ref 0.1–0.9)
MONOCYTES RELATIVE PERCENT: 8 %
MORPHOLOGY: NORMAL
NEUTROPHILS ABSOLUTE: 2.4 X10E3/UL (ref 1.4–7)
PDW BLD-RTO: 11.9 % (ref 11.6–15.4)
PLATELET # BLD: 179 X10E3/UL (ref 150–450)
POTASSIUM SERPL-SCNC: 4.2 MMOL/L (ref 3.5–5.2)
PROSTATE SPECIFIC ANTIGEN: 3.9 NG/ML (ref 0–4)
RBC # BLD: 4.56 X10E6/UL (ref 4.14–5.8)
SEGMENTED NEUTROPHILS RELATIVE PERCENT: 54 %
SODIUM BLD-SCNC: 143 MMOL/L (ref 134–144)
TOTAL PROTEIN: 5.8 G/DL (ref 6–8.5)
TRIGL SERPL-MCNC: 63 MG/DL (ref 0–149)
VLDLC SERPL CALC-MCNC: 13 MG/DL (ref 5–40)
WBC # BLD: 4.4 X10E3/UL (ref 3.4–10.8)

## 2022-08-10 NOTE — RESULT ENCOUNTER NOTE
Labs are stable without significant change from last test.    OK to follow up as scheduled to review results in detail.     Smita Monteiro MD

## 2022-08-11 ENCOUNTER — TELEPHONE (OUTPATIENT)
Dept: PRIMARY CARE CLINIC | Age: 67
End: 2022-08-11

## 2022-08-11 NOTE — TELEPHONE ENCOUNTER
Pt has concerns for having to check Lipid Labs every 6 months and OV every 6 months due to Insurance not wanting to cover it. Is this something that can be done just yearly and still Get his Lipitor medication. I did suggest pt call his insurance to discuss it also. Please advise.

## 2022-08-12 NOTE — TELEPHONE ENCOUNTER
I would recommend labs every 6 months but if he only wants to do them once a year, then he can decide for himself    Nathan Davis MD

## 2022-08-15 NOTE — TELEPHONE ENCOUNTER
Spoke with the pt. Pt informed of  recommendations. Pt states that he only wants to come in once a yr also because his insurance doesn't cover office visits except for the once a yr. Is this ok?  He doesn't want to interfere with his refills

## 2022-08-23 ENCOUNTER — TELEPHONE (OUTPATIENT)
Dept: PRIMARY CARE CLINIC | Age: 67
End: 2022-08-23

## 2022-08-23 NOTE — TELEPHONE ENCOUNTER
Symptomatic management with OTC meds as needed, assume that he probably does have COVID    Self quarantine for 5-10 days until asymptomatic for at least 24 hours off of meds    Continue wearing mask for duration of the 10 days if breaks quarantine early    ER evaluation would be needed if severe shortness of breath develops

## 2022-08-23 NOTE — TELEPHONE ENCOUNTER
Pt is calling stating his wife has covid she tested positive last Thursday pt stated he started to get symptoms that his wife has pt took a at home test Sunday and yesterday and the at home test are coming up negative pt states he really believes he has it and does not know what to do  pt states it feels like a really bad cold has not cough anything up or when her blows his noise pt states it has messed with his bowels highest temp is 98.6 pt is getting the sweats the first night pt symptoms are raspy throat ,coughing,raspy voice,fatigue,when he takes a deep breath wants to cough, pt states he was achy does not feel like that so much today he states

## 2022-08-24 NOTE — TELEPHONE ENCOUNTER
Pt states he tested positive today with a different type of inhome kit.  I advised to follow same recommendations from Dr. Jason Arevalo

## 2022-09-07 ENCOUNTER — PROCEDURE VISIT (OUTPATIENT)
Dept: AUDIOLOGY | Age: 67
End: 2022-09-07
Payer: MEDICARE

## 2022-09-07 DIAGNOSIS — H90.3 SENSORINEURAL HEARING LOSS (SNHL) OF BOTH EARS: Primary | ICD-10-CM

## 2022-09-07 DIAGNOSIS — H93.13 SUBJECTIVE TINNITUS OF BOTH EARS: ICD-10-CM

## 2022-09-07 PROCEDURE — 92567 TYMPANOMETRY: CPT | Performed by: AUDIOLOGIST

## 2022-09-07 PROCEDURE — 92557 COMPREHENSIVE HEARING TEST: CPT | Performed by: AUDIOLOGIST

## 2022-09-07 NOTE — PROGRESS NOTES
loss.  Speech Recognition Threshold: 25 dB HL  Word Recognition: Excellent (%), based on NU-6   Tympanometry: Normal peak pressure and compliance, Type A tympanogram, consistent with normal middle ear function. Acoustic Reflexes: Ipsilateral: Present at elevated sensation levels and Absent at isolated frequencies. Quick SIN (hearing performance in noise) assessment was administered. Florina Carbajal scored  6.5 dB signal-noise ratio loss, suggestive of difficulty hearing during the presence of noise or complex listening enviornments. COUNSELING:      Reviewed purpose of testing completed today, general auditory system function, and results obtained today. The following items are recommended based on patient report and results from today's appointment:   - Continue medical follow-up with Tameka Sparks MD .   - Retest hearing as medically indicated and/or sooner if a change in hearing is noted. - If desired, schedule a Hearing Aid Evaluation (HAE) appointment to discuss hearing aid options. Chart CC'd to: Tameka Sparks MD       Degree of   Hearing Sensitivity dB Range   Within Normal Limits (WNL) 0 - 20   Mild 20 - 40   Moderate 40 - 55   Moderately-Severe 55 - 70   Severe 70 - 90   Profound 90 +        RECOMMENDATIONS:      Patient is a candidate for bilateral amplification. Discussed at length benefits of amplification with respect to cognition. He will consider.  -Return annually to monitor hearing levels.     Noreen Adams  Audiologist    Electronically signed by Noreen Adams on 9/7/22 at 1:09 PM.

## 2022-09-07 NOTE — Clinical Note
Thank you for the referral, Dr Sergio Rosado. Patient and I mutually agreed on proceeding with amplification.

## 2022-12-14 ENCOUNTER — OFFICE VISIT (OUTPATIENT)
Dept: PRIMARY CARE CLINIC | Age: 67
End: 2022-12-14
Payer: MEDICARE

## 2022-12-14 ENCOUNTER — NURSE TRIAGE (OUTPATIENT)
Dept: OTHER | Facility: CLINIC | Age: 67
End: 2022-12-14

## 2022-12-14 VITALS
DIASTOLIC BLOOD PRESSURE: 82 MMHG | WEIGHT: 170 LBS | BODY MASS INDEX: 23.71 KG/M2 | HEART RATE: 82 BPM | TEMPERATURE: 97.4 F | OXYGEN SATURATION: 97 % | SYSTOLIC BLOOD PRESSURE: 136 MMHG

## 2022-12-14 DIAGNOSIS — I10 HYPERTENSION, UNSPECIFIED TYPE: Primary | ICD-10-CM

## 2022-12-14 PROCEDURE — 3078F DIAST BP <80 MM HG: CPT | Performed by: INTERNAL MEDICINE

## 2022-12-14 PROCEDURE — 99213 OFFICE O/P EST LOW 20 MIN: CPT | Performed by: INTERNAL MEDICINE

## 2022-12-14 PROCEDURE — 3074F SYST BP LT 130 MM HG: CPT | Performed by: INTERNAL MEDICINE

## 2022-12-14 PROCEDURE — 3017F COLORECTAL CA SCREEN DOC REV: CPT | Performed by: INTERNAL MEDICINE

## 2022-12-14 PROCEDURE — 1123F ACP DISCUSS/DSCN MKR DOCD: CPT | Performed by: INTERNAL MEDICINE

## 2022-12-14 PROCEDURE — G8420 CALC BMI NORM PARAMETERS: HCPCS | Performed by: INTERNAL MEDICINE

## 2022-12-14 PROCEDURE — 1036F TOBACCO NON-USER: CPT | Performed by: INTERNAL MEDICINE

## 2022-12-14 PROCEDURE — G8427 DOCREV CUR MEDS BY ELIG CLIN: HCPCS | Performed by: INTERNAL MEDICINE

## 2022-12-14 PROCEDURE — G8484 FLU IMMUNIZE NO ADMIN: HCPCS | Performed by: INTERNAL MEDICINE

## 2022-12-14 RX ORDER — TAMSULOSIN HYDROCHLORIDE 0.4 MG/1
CAPSULE ORAL
COMMUNITY
Start: 2022-12-02

## 2022-12-14 ASSESSMENT — PATIENT HEALTH QUESTIONNAIRE - PHQ9
SUM OF ALL RESPONSES TO PHQ QUESTIONS 1-9: 0
SUM OF ALL RESPONSES TO PHQ QUESTIONS 1-9: 0
SUM OF ALL RESPONSES TO PHQ9 QUESTIONS 1 & 2: 0
1. LITTLE INTEREST OR PLEASURE IN DOING THINGS: 0
SUM OF ALL RESPONSES TO PHQ QUESTIONS 1-9: 0
SUM OF ALL RESPONSES TO PHQ QUESTIONS 1-9: 0
2. FEELING DOWN, DEPRESSED OR HOPELESS: 0

## 2022-12-14 NOTE — PROGRESS NOTES
12/14/2022   Nikhil Harmon  1955    The patients PMH, surgical history, family history, medications, allergies were all reviewed and updated as appropriate today. Current Outpatient Medications on File Prior to Visit   Medication Sig Dispense Refill    atorvastatin (LIPITOR) 10 MG tablet One daily 90 tablet 1    pantoprazole (PROTONIX) 40 MG tablet TAKE 1 TABLET BY MOUTH ONCE DAILY      Omega-3 Fatty Acids (FISH OIL) 1200 MG CAPS Take 1 capsule by mouth daily      therapeutic multivitamin-minerals (THERAGRAN-M) tablet Take 1 tablet by mouth daily. Calcium Carb-Cholecalciferol (CALCIUM PLUS VITAMIN D3) 600-500 MG-UNIT CAPS Take 1 tablet by mouth daily. Saw Palmetto, Serenoa repens, 450 MG CAPS Take 1 capsule by mouth daily. No current facility-administered medications on file prior to visit. Chief Complaint   Patient presents with    Hypertension     Pt C/o high Bp at home 170/100 , Pt states they felt light headed. Pt has blood vessel in OS which has bursted and Pt states this is the worst it has been. Pt states they noticed it yesterday at home. HPI:  presents today for EMERGENCY visit, pt was sent directly to office for BP check after a phone call this AM  On no BP meds currently but is on Lipitor, checked BP this AM at home was reportedly 170/100 so he was sent to office for immediate evaluation    Noticed subconjunctival hemorrhage OS yesterday which has created great degree of concern for him   \"I already limit my coffee and low salt\"  2 BP cuffs at home had similar high BP readings. .    Review of Systems    OBJECTIVE:  BP (!) 142/84   Pulse 82   Temp 97.4 °F (36.3 °C) (Infrared)   Wt 170 lb (77.1 kg)   SpO2 97%   BMI 23.71 kg/m²       Physical Exam  Vitals and nursing note reviewed. Constitutional:       General: He is not in acute distress. Appearance: He is well-developed. Comments: There were no vitals taken for this visit.    HENT: Head: Normocephalic and atraumatic. Eyes:      General: No scleral icterus. Right eye: No discharge. Left eye: No discharge. Conjunctiva/sclera: Conjunctivae normal.      Pupils: Pupils are equal, round, and reactive to light. Neck:      Thyroid: No thyromegaly. Vascular: No JVD. Trachea: No tracheal deviation. Cardiovascular:      Rate and Rhythm: Normal rate and regular rhythm. Heart sounds: Normal heart sounds. No murmur heard. Pulmonary:      Effort: Pulmonary effort is normal. No respiratory distress. Breath sounds: Normal breath sounds. No wheezing or rales. Abdominal:      General: Bowel sounds are normal. There is no distension. Palpations: Abdomen is soft. Tenderness: There is no abdominal tenderness. There is no guarding or rebound. Musculoskeletal:         General: No tenderness. Normal range of motion. Cervical back: Normal range of motion and neck supple. Lymphadenopathy:      Cervical: No cervical adenopathy. Skin:     General: Skin is warm and dry. Findings: No erythema or rash. Neurological:      Mental Status: He is alert and oriented to person, place, and time. Cranial Nerves: No cranial nerve deficit. Coordination: Coordination normal.      Deep Tendon Reflexes: Reflexes normal.   Psychiatric:         Behavior: Behavior normal.         Thought Content:  Thought content normal.       Data Review:   CBC:   Lab Results   Component Value Date/Time    WBC 4.4 08/09/2022 08:12 AM    WBC 4.2 02/17/2022 08:22 AM    WBC 4.1 07/01/2021 08:17 AM    HGB 14.1 08/09/2022 08:12 AM    HGB 14.5 02/17/2022 08:22 AM    HGB 14.6 07/01/2021 08:17 AM    HCT 42.5 08/09/2022 08:12 AM    HCT 42.8 02/17/2022 08:22 AM    HCT 44.5 07/01/2021 08:17 AM    MCV 93 08/09/2022 08:12 AM    MCV 92 02/17/2022 08:22 AM    MCV 94 07/01/2021 08:17 AM     08/09/2022 08:12 AM     02/17/2022 08:22 AM     07/01/2021 08:17 AM Chemistry:   Lab Results   Component Value Date/Time     08/09/2022 08:12 AM     02/17/2022 08:22 AM     07/01/2021 08:17 AM    K 4.2 08/09/2022 08:12 AM    K 4.0 02/17/2022 08:22 AM    K 4.1 07/01/2021 08:17 AM     08/09/2022 08:12 AM     02/17/2022 08:22 AM     07/01/2021 08:17 AM    CO2 27 08/09/2022 08:12 AM    CO2 27 02/17/2022 08:22 AM    CO2 27 07/01/2021 08:17 AM    BUN 23 08/09/2022 08:12 AM    BUN 22 02/17/2022 08:22 AM    BUN 21 07/01/2021 08:17 AM    CREATININE 1.24 08/09/2022 08:12 AM    CREATININE 1.18 02/17/2022 08:22 AM    CREATININE 1.18 07/01/2021 08:17 AM     Hepatic Function:   Lab Results   Component Value Date/Time    AST 32 08/09/2022 08:12 AM    AST 26 02/17/2022 08:22 AM    AST 27 07/01/2021 08:17 AM    ALT 20 08/09/2022 08:12 AM    ALT 17 02/17/2022 08:22 AM    ALT 23 07/01/2021 08:17 AM    BILITOT 1.3 08/09/2022 08:12 AM    BILITOT 1.2 02/17/2022 08:22 AM    BILITOT 1.1 07/01/2021 08:17 AM    ALKPHOS 52 08/09/2022 08:12 AM    ALKPHOS 52 02/17/2022 08:22 AM    ALKPHOS 53 07/01/2021 08:17 AM     Lab Results   Component Value Date/Time    LIPASE 40 12/14/2015 10:27 AM    AMYLASE 63 11/04/2019 03:46 PM    AMYLASE 87 12/14/2015 10:27 AM     Lipids:   Lab Results   Component Value Date/Time    CHOL 136 08/09/2022 08:12 AM    HDL 60 08/09/2022 08:12 AM    TRIG 63 08/09/2022 08:12 AM       ASSESSMENT/PLAN  1.) HTN-OK to monitor BP with low salt diet/DASH diet printed  No clinical need for BP meds today  Pt will return in 2 weeks for BP check only and for BP cuff correlation    2.) Pt reassured re: subconjunctival hemorrhage OS  OK to stop ASA due to hemorrhage  \"You sent me for stress test 2 years ago and all was WNL then\"      Tae Gomes MD        Electronically signed by Tae Gomes MD on 12/14/2022 at 10:20 AM

## 2022-12-14 NOTE — TELEPHONE ENCOUNTER
Location of patient: [place]    Received call from Jennifer Whiteside at [facility] with Red Flag Complaint. Subjective: Caller states \"Dr. Esha Tobias is my doctor. I noticed yesterday I was eating supper and I had one of the blood vessels in my eye. This time it was really severe, it was all the way around my eye. I checked my BP and it was 160-170/100. I'm not SOB, I'm not having chest pain. I checked it throughout the evening and this morning. I have had some lightheadedness, not just yesterday. I do have sinus issues. I don't know if that's got any bearing on it or not. \"     Current Symptoms: elevated -170/100 yesterday, 156/99 NOW; HR 97, broken blood vessel in left eye, lightheadedness x1-NOT now, urinating frequently-enlarged prostate, NO SOB, NO chest pain, NO headache, NO blurred vision    NOT taking BP medication currently    Onset: 1 day ago; unchanged    Associated Symptoms: NA    Pain Severity: Denies    Temperature: Denies    What has been tried: Nothing    LMP: NA Pregnant: NA    Recommended disposition: See in Office Today. Patient agreeable. Care advice provided, patient verbalizes understanding; denies any other questions or concerns; instructed to call back for any new or worsening symptoms. Patient/Caller agrees with recommended disposition; writer provided warm transfer to [facility] at [facility] for appointment scheduling. Attention Provider: Thank you for allowing me to participate in the care of your patient. The patient was connected to triage in response to information provided to the ECC/PSC. Please do not respond through this encounter as the response is not directed to a shared pool.       Reason for Disposition   Patient wants to be seen    Protocols used: Blood Pressure - High-ADULT-[place]

## 2022-12-28 ENCOUNTER — NURSE ONLY (OUTPATIENT)
Dept: PRIMARY CARE CLINIC | Age: 67
End: 2022-12-28

## 2022-12-28 VITALS — DIASTOLIC BLOOD PRESSURE: 72 MMHG | SYSTOLIC BLOOD PRESSURE: 136 MMHG

## 2022-12-28 NOTE — PROGRESS NOTES
Nikhil Dwyer (:  1955) is a 79 y.o. male,{New vs Established:[id number]::\"Established patient\"}, here for evaluation of the following chief complaint(s):  Blood Pressure Check (Pt here for MA visit )         ASSESSMENT/PLAN:  {There are no diagnoses linked to this encounter. (Refresh or delete this SmartLink)}    No follow-ups on file. Subjective   SUBJECTIVE/OBJECTIVE:  HPI    Review of Systems       Objective   Physical Exam       {Time Documentation Optional:[id number]}      An electronic signature was used to authenticate this note.     --Izaiah Queen MA

## 2022-12-28 NOTE — PROGRESS NOTES
Pt here for MA  BP check , Pt was directed by PCP to come for 2 week check up. /72    Pt was advised to come back in two weeks for follow up with PCP for Hypertension per DR. Hayden Nguyễn.

## 2023-02-16 DIAGNOSIS — E78.00 PURE HYPERCHOLESTEROLEMIA: ICD-10-CM

## 2023-02-16 NOTE — TELEPHONE ENCOUNTER
Recent Visits  Date Type Provider Dept   12/14/22 Office Visit Jose Mendes MD [facility]   07/07/22 Office Visit Jose Mendes MD [facility]   Showing recent visits within past 540 days with a meds authorizing provider and meeting all other requirements  Future Appointments  No visits were found meeting these conditions.  Showing future appointments within next 150 days with a meds authorizing provider and meeting all other requirements

## 2023-02-17 RX ORDER — ATORVASTATIN CALCIUM 10 MG/1
TABLET, FILM COATED ORAL
Qty: 90 TABLET | Refills: 0 | Status: SHIPPED | OUTPATIENT
Start: 2023-02-17

## 2023-05-16 DIAGNOSIS — E78.00 PURE HYPERCHOLESTEROLEMIA: ICD-10-CM

## 2023-05-16 RX ORDER — ATORVASTATIN CALCIUM 10 MG/1
TABLET, FILM COATED ORAL
Qty: 90 TABLET | Refills: 0 | Status: SHIPPED | OUTPATIENT
Start: 2023-05-16

## 2023-07-18 ENCOUNTER — TELEPHONE (OUTPATIENT)
Dept: PRIMARY CARE CLINIC | Age: 68
End: 2023-07-18

## 2023-07-18 DIAGNOSIS — K21.9 GASTROESOPHAGEAL REFLUX DISEASE WITHOUT ESOPHAGITIS: ICD-10-CM

## 2023-07-18 DIAGNOSIS — E78.00 PURE HYPERCHOLESTEROLEMIA: Primary | Chronic | ICD-10-CM

## 2023-07-18 DIAGNOSIS — N40.0 ENLARGED PROSTATE: ICD-10-CM

## 2023-07-18 DIAGNOSIS — Z12.5 PROSTATE CANCER SCREENING: ICD-10-CM

## 2023-07-18 NOTE — TELEPHONE ENCOUNTER
Patient message:  Need a new order written for my lab work. Dr Kumar Leon had me on a 6 mo plan but we agreed to annual labs unless results were abnormal. Tests are CBC w/Auto Differential, Comprehensive Metabolic Panel (fasting), & Lipid, Fasting. All with diagnosis Pure hypercholesterolemia as on previous tests. Need a PSA order for cancer screening as I have an enlarged prostate (my urologist will need this for my yearly exam). Lastly, my gastroenterologist, Nikhil Andrade, sees me for acid reflux and has me on pantoprazole long term. He would like Dr Kumar Leon to add a magnesium and vit D test to my lab order. I will get the tests completed the week before I see Dr Kumar Leon for my wellness check, requested on 8/14/23.

## 2023-08-02 DIAGNOSIS — N28.9 RENAL INSUFFICIENCY: Primary | ICD-10-CM

## 2023-08-02 LAB
A/G RATIO: 2.8 (ref 1.2–2.2)
ALBUMIN SERPL-MCNC: 4.5 G/DL (ref 3.9–4.9)
ALP BLD-CCNC: 50 IU/L (ref 44–121)
ALT SERPL-CCNC: 22 IU/L (ref 0–44)
AST SERPL-CCNC: 31 IU/L (ref 0–40)
BASOPHILS ABSOLUTE: 0.1 X10E3/UL (ref 0–0.2)
BASOPHILS RELATIVE PERCENT: 1 %
BILIRUB SERPL-MCNC: 1 MG/DL (ref 0–1.2)
BUN / CREAT RATIO: 14 (ref 10–24)
BUN BLDV-MCNC: 20 MG/DL (ref 8–27)
CALCIUM SERPL-MCNC: 9.6 MG/DL (ref 8.6–10.2)
CHLORIDE BLD-SCNC: 102 MMOL/L (ref 96–106)
CHOLESTEROL, TOTAL: 157 MG/DL (ref 100–199)
CO2: 27 MMOL/L (ref 20–29)
COMMENT: NORMAL
CREAT SERPL-MCNC: 1.45 MG/DL (ref 0.76–1.27)
EOSINOPHILS ABSOLUTE: 0.3 X10E3/UL (ref 0–0.4)
EOSINOPHILS RELATIVE PERCENT: 7 %
ERYTHROCYTES, NUCLEATED/100 LEU: NORMAL
ESTIMATED GLOMERULAR FILTRATION RATE CREATININE EQUATION: 52 ML/MIN/1.73
GLOBULIN: 1.6 G/DL (ref 1.5–4.5)
GLUCOSE BLD-MCNC: 89 MG/DL (ref 70–99)
HCT VFR BLD CALC: 44.2 % (ref 37.5–51)
HDLC SERPL-MCNC: 73 MG/DL
HEMOGLOBIN: 14.6 G/DL (ref 13–17.7)
IMMATURE CELLS ABSOLUTE COUNT: NORMAL
IMMATURE GRANS (ABS): 0 X10E3/UL (ref 0–0.1)
IMMATURE GRANULOCYTES: 0 %
LDL CHOLESTEROL CALCULATED: 73 MG/DL (ref 0–99)
LYMPHOCYTES ABSOLUTE: 1.3 X10E3/UL (ref 0.7–3.1)
LYMPHOCYTES RELATIVE PERCENT: 30 %
Lab: NORMAL
MAGNESIUM: 2.3 MG/DL (ref 1.6–2.3)
MCH RBC QN AUTO: 30.4 PG (ref 26.6–33)
MCHC RBC AUTO-ENTMCNC: 33 G/DL (ref 31.5–35.7)
MCV RBC AUTO: 92 FL (ref 79–97)
MONOCYTES ABSOLUTE: 0.4 X10E3/UL (ref 0.1–0.9)
MONOCYTES RELATIVE PERCENT: 9 %
MORPHOLOGY: NORMAL
NEUTROPHILS ABSOLUTE: 2.2 X10E3/UL (ref 1.4–7)
PDW BLD-RTO: 11.7 % (ref 11.6–15.4)
PLATELET # BLD: 203 X10E3/UL (ref 150–450)
POTASSIUM SERPL-SCNC: 4.6 MMOL/L (ref 3.5–5.2)
PROSTATE SPECIFIC ANTIGEN: 3.4 NG/ML (ref 0–4)
RBC # BLD: 4.81 X10E6/UL (ref 4.14–5.8)
SEGMENTED NEUTROPHILS RELATIVE PERCENT: 53 %
SODIUM BLD-SCNC: 142 MMOL/L (ref 134–144)
TOTAL PROTEIN: 6.1 G/DL (ref 6–8.5)
TRIGL SERPL-MCNC: 55 MG/DL (ref 0–149)
VLDLC SERPL CALC-MCNC: 11 MG/DL (ref 5–40)
WBC # BLD: 4.2 X10E3/UL (ref 3.4–10.8)

## 2023-08-02 NOTE — RESULT ENCOUNTER NOTE
All labs OK except kidney function looks somewhat worse    Avoid OTC NSAID's (Advil/Aleve) and encourage oral fluid intake    Repeat non-fasting renal panel in 1 month to monitor kidney function    Laura Tan MD

## 2023-08-06 DIAGNOSIS — E78.00 PURE HYPERCHOLESTEROLEMIA: ICD-10-CM

## 2023-08-08 RX ORDER — ATORVASTATIN CALCIUM 10 MG/1
TABLET, FILM COATED ORAL
Qty: 90 TABLET | Refills: 0 | Status: SHIPPED | OUTPATIENT
Start: 2023-08-08

## 2023-08-12 SDOH — ECONOMIC STABILITY: FOOD INSECURITY: WITHIN THE PAST 12 MONTHS, YOU WORRIED THAT YOUR FOOD WOULD RUN OUT BEFORE YOU GOT MONEY TO BUY MORE.: NEVER TRUE

## 2023-08-12 SDOH — HEALTH STABILITY: PHYSICAL HEALTH: ON AVERAGE, HOW MANY MINUTES DO YOU ENGAGE IN EXERCISE AT THIS LEVEL?: 30 MIN

## 2023-08-12 SDOH — ECONOMIC STABILITY: FOOD INSECURITY: WITHIN THE PAST 12 MONTHS, THE FOOD YOU BOUGHT JUST DIDN'T LAST AND YOU DIDN'T HAVE MONEY TO GET MORE.: NEVER TRUE

## 2023-08-12 SDOH — ECONOMIC STABILITY: HOUSING INSECURITY
IN THE LAST 12 MONTHS, WAS THERE A TIME WHEN YOU DID NOT HAVE A STEADY PLACE TO SLEEP OR SLEPT IN A SHELTER (INCLUDING NOW)?: NO

## 2023-08-12 SDOH — ECONOMIC STABILITY: TRANSPORTATION INSECURITY
IN THE PAST 12 MONTHS, HAS LACK OF TRANSPORTATION KEPT YOU FROM MEETINGS, WORK, OR FROM GETTING THINGS NEEDED FOR DAILY LIVING?: NO

## 2023-08-12 SDOH — ECONOMIC STABILITY: INCOME INSECURITY: HOW HARD IS IT FOR YOU TO PAY FOR THE VERY BASICS LIKE FOOD, HOUSING, MEDICAL CARE, AND HEATING?: NOT HARD AT ALL

## 2023-08-12 SDOH — HEALTH STABILITY: PHYSICAL HEALTH: ON AVERAGE, HOW MANY DAYS PER WEEK DO YOU ENGAGE IN MODERATE TO STRENUOUS EXERCISE (LIKE A BRISK WALK)?: 5 DAYS

## 2023-08-12 ASSESSMENT — PATIENT HEALTH QUESTIONNAIRE - PHQ9
SUM OF ALL RESPONSES TO PHQ QUESTIONS 1-9: 0
1. LITTLE INTEREST OR PLEASURE IN DOING THINGS: 0
2. FEELING DOWN, DEPRESSED OR HOPELESS: 0
SUM OF ALL RESPONSES TO PHQ9 QUESTIONS 1 & 2: 0

## 2023-08-12 ASSESSMENT — LIFESTYLE VARIABLES
HOW OFTEN DO YOU HAVE A DRINK CONTAINING ALCOHOL: NEVER
HOW OFTEN DO YOU HAVE A DRINK CONTAINING ALCOHOL: 1
HOW OFTEN DO YOU HAVE SIX OR MORE DRINKS ON ONE OCCASION: 1
HOW MANY STANDARD DRINKS CONTAINING ALCOHOL DO YOU HAVE ON A TYPICAL DAY: 0
HOW MANY STANDARD DRINKS CONTAINING ALCOHOL DO YOU HAVE ON A TYPICAL DAY: PATIENT DOES NOT DRINK

## 2023-08-15 ENCOUNTER — OFFICE VISIT (OUTPATIENT)
Dept: PRIMARY CARE CLINIC | Age: 68
End: 2023-08-15

## 2023-08-15 VITALS
BODY MASS INDEX: 23.94 KG/M2 | DIASTOLIC BLOOD PRESSURE: 80 MMHG | HEART RATE: 79 BPM | OXYGEN SATURATION: 97 % | SYSTOLIC BLOOD PRESSURE: 132 MMHG | WEIGHT: 171 LBS | HEIGHT: 71 IN

## 2023-08-15 DIAGNOSIS — K21.9 GASTROESOPHAGEAL REFLUX DISEASE, UNSPECIFIED WHETHER ESOPHAGITIS PRESENT: ICD-10-CM

## 2023-08-15 DIAGNOSIS — E78.00 PURE HYPERCHOLESTEROLEMIA: Chronic | ICD-10-CM

## 2023-08-15 DIAGNOSIS — Z23 NEED FOR PROPHYLACTIC VACCINATION AND INOCULATION AGAINST VARICELLA: ICD-10-CM

## 2023-08-15 DIAGNOSIS — Z12.5 PROSTATE CANCER SCREENING: Primary | ICD-10-CM

## 2023-08-15 DIAGNOSIS — E55.9 VITAMIN D DEFICIENCY: ICD-10-CM

## 2023-08-15 DIAGNOSIS — Z00.00 MEDICARE ANNUAL WELLNESS VISIT, SUBSEQUENT: ICD-10-CM

## 2023-08-15 RX ORDER — ATORVASTATIN CALCIUM 10 MG/1
10 TABLET, FILM COATED ORAL DAILY
Qty: 90 TABLET | Refills: 1 | Status: SHIPPED | OUTPATIENT
Start: 2023-08-15

## 2023-08-15 SDOH — ECONOMIC STABILITY: INCOME INSECURITY: HOW HARD IS IT FOR YOU TO PAY FOR THE VERY BASICS LIKE FOOD, HOUSING, MEDICAL CARE, AND HEATING?: NOT HARD AT ALL

## 2023-08-15 SDOH — ECONOMIC STABILITY: FOOD INSECURITY: WITHIN THE PAST 12 MONTHS, YOU WORRIED THAT YOUR FOOD WOULD RUN OUT BEFORE YOU GOT MONEY TO BUY MORE.: NEVER TRUE

## 2023-08-15 SDOH — ECONOMIC STABILITY: FOOD INSECURITY: WITHIN THE PAST 12 MONTHS, THE FOOD YOU BOUGHT JUST DIDN'T LAST AND YOU DIDN'T HAVE MONEY TO GET MORE.: NEVER TRUE

## 2023-08-15 NOTE — PATIENT INSTRUCTIONS
is usually necessary to meet this goal.  When exposed to the sun, use a sunscreen that protects against both UVA and UVB radiation with an SPF of 30 or greater. Reapply every 2 to 3 hours or after sweating, drying off with a towel, or swimming. Always wear a seat belt when traveling in a car. Always wear a helmet when riding a bicycle or motorcycle.

## 2023-08-15 NOTE — PROGRESS NOTES
Here for Annual Medicare Wellness visit again today  Needs Lipitor 10 refills OK'd  Needs recent labs reviewed and needs orders again in 6 months  Consider Shingrix vaccination series of 2 shots over 2-6 months if desired for protection from shingles-check with INS first re: coverage before beginning series  Annual flu vaccination is advised every Fall  I see Urology for my prostate in November  I still get up at night so I don't drink enough    Medicare Annual Wellness Visit    Tray Richardson is here for No chief complaint on file. Assessment & Plan   Prostate cancer screening  -     PSA Screening; Future  Pure hypercholesterolemia  -     CBC with Auto Differential; Future  -     Comprehensive Metabolic Panel, Fasting; Future  -     Lipid, Fasting; Future  -     MICROALBUMIN / CREATININE URINE RATIO; Future  -     atorvastatin (LIPITOR) 10 MG tablet; Take 1 tablet by mouth daily, Disp-90 tablet, R-1Normal  Vitamin D deficiency  -     Vitamin D 25 Hydroxy; Future  Gastroesophageal reflux disease, unspecified whether esophagitis present  -     Magnesium; Future    Recommendations for Preventive Services Due: see orders and patient instructions/AVS.  Recommended screening schedule for the next 5-10 years is provided to the patient in written form: see Patient Instructions/AVS.     No follow-ups on file. Subjective   The following acute and/or chronic problems were also addressed today:  No issues    Patient's complete Health Risk Assessment and screening values have been reviewed and are found in Flowsheets. The following problems were reviewed today and where indicated follow up appointments were made and/or referrals ordered.     Positive Risk Factor Screenings with Interventions:                   Hearing Screen:  Do you or your family notice any trouble with your hearing that hasn't been managed with hearing aids?: (!) Yes    Interventions:  Patient comments: already has hearing aid but wants to get new

## 2023-09-06 LAB
ALBUMIN SERPL-MCNC: 4.3 G/DL (ref 3.9–4.9)
BUN / CREAT RATIO: 13 (ref 10–24)
BUN BLDV-MCNC: 18 MG/DL (ref 8–27)
CALCIUM SERPL-MCNC: 9.7 MG/DL (ref 8.6–10.2)
CHLORIDE BLD-SCNC: 106 MMOL/L (ref 96–106)
CO2: 28 MMOL/L (ref 20–29)
CREAT SERPL-MCNC: 1.39 MG/DL (ref 0.76–1.27)
ESTIMATED GLOMERULAR FILTRATION RATE CREATININE EQUATION: 55 ML/MIN/1.73
GLUCOSE BLD-MCNC: 95 MG/DL (ref 70–99)
PHOSPHORUS: 2.4 MG/DL (ref 2.8–4.1)
POTASSIUM SERPL-SCNC: 4.5 MMOL/L (ref 3.5–5.2)
SODIUM BLD-SCNC: 143 MMOL/L (ref 134–144)

## 2023-12-15 ENCOUNTER — HOSPITAL ENCOUNTER (OUTPATIENT)
Dept: ULTRASOUND IMAGING | Age: 68
Discharge: HOME | End: 2023-12-15
Payer: COMMERCIAL

## 2023-12-15 DIAGNOSIS — N20.0 CALCULUS OF KIDNEY: ICD-10-CM

## 2023-12-15 PROCEDURE — 76770 US EXAM ABDO BACK WALL COMP: CPT

## 2024-01-22 ENCOUNTER — COMMUNITY OUTREACH (OUTPATIENT)
Dept: PRIMARY CARE CLINIC | Age: 69
End: 2024-01-22

## 2024-02-06 ENCOUNTER — OFFICE VISIT (OUTPATIENT)
Dept: PRIMARY CARE CLINIC | Age: 69
End: 2024-02-06
Payer: MEDICARE

## 2024-02-06 VITALS
DIASTOLIC BLOOD PRESSURE: 80 MMHG | SYSTOLIC BLOOD PRESSURE: 140 MMHG | BODY MASS INDEX: 24.27 KG/M2 | HEART RATE: 81 BPM | WEIGHT: 174 LBS | OXYGEN SATURATION: 98 %

## 2024-02-06 DIAGNOSIS — K21.9 GASTROESOPHAGEAL REFLUX DISEASE, UNSPECIFIED WHETHER ESOPHAGITIS PRESENT: Primary | ICD-10-CM

## 2024-02-06 PROCEDURE — 1036F TOBACCO NON-USER: CPT | Performed by: INTERNAL MEDICINE

## 2024-02-06 PROCEDURE — G8420 CALC BMI NORM PARAMETERS: HCPCS | Performed by: INTERNAL MEDICINE

## 2024-02-06 PROCEDURE — 3017F COLORECTAL CA SCREEN DOC REV: CPT | Performed by: INTERNAL MEDICINE

## 2024-02-06 PROCEDURE — 99213 OFFICE O/P EST LOW 20 MIN: CPT | Performed by: INTERNAL MEDICINE

## 2024-02-06 PROCEDURE — G8484 FLU IMMUNIZE NO ADMIN: HCPCS | Performed by: INTERNAL MEDICINE

## 2024-02-06 PROCEDURE — G8427 DOCREV CUR MEDS BY ELIG CLIN: HCPCS | Performed by: INTERNAL MEDICINE

## 2024-02-06 PROCEDURE — 1123F ACP DISCUSS/DSCN MKR DOCD: CPT | Performed by: INTERNAL MEDICINE

## 2024-02-06 NOTE — PROGRESS NOTES
2/6/2024   Michael Canales  1955    The patients PMH, surgical history, family history, medications, allergies were all reviewed and updated as appropriate today.     Current Outpatient Medications on File Prior to Visit   Medication Sig Dispense Refill    atorvastatin (LIPITOR) 10 MG tablet Take 1 tablet by mouth daily 90 tablet 1    tamsulosin (FLOMAX) 0.4 MG capsule TAKE 1 CAPSULE BY MOUTH AT BEDTIME      triamcinolone (KENALOG) 0.1 % ointment APPLY TOPICALLY TO HANDS TWICE DAILY, AND TO FEET ONCE DAILY FOR 1 WEEK AS NEEDED      pantoprazole (PROTONIX) 40 MG tablet TAKE 1 TABLET BY MOUTH ONCE DAILY      Omega-3 Fatty Acids (FISH OIL) 1200 MG CAPS Take 1,200 mg by mouth daily      therapeutic multivitamin-minerals (THERAGRAN-M) tablet Take 1 tablet by mouth daily      Calcium Carb-Cholecalciferol 600-500 MG-UNIT CAPS Take 1 tablet by mouth daily.        Saw Palmetto, Serenoa repens, 450 MG CAPS Take 1 capsule by mouth daily.         No current facility-administered medications on file prior to visit.        Chief Complaint   Patient presents with    Bump on sternum      Thinks maybe due to acid reflux.        Pt arrives 15min prior to scheduled appt  HPI:  Emergency appt due to \"pt noticed a bump on sternum this AM\" that he thinks may be due to acid reflux    Review of Systems  Now has several more questions  Lab Results   Component Value Date    CREATININE 1.39 (H) 09/05/2023    BUN 18 09/05/2023     09/05/2023    K 4.5 09/05/2023     09/05/2023    CO2 28 09/05/2023   I saw my Urologist I did an US aSmaria He doubled my Flomax, he put me on finasteride, I see him in June  I WANT MY LABS DONE AGAIN-already ordered  Now Im still very worried, I always feel lightheaded  Then I did get my shingrix shots   I JUST TRY TO CATCH UP ON EVERYTHING  OBJECTIVE:  BP (!) 140/80 (Site: Left Upper Arm, Position: Sitting, Cuff Size: Large Adult)   Pulse 81   Wt 78.9 kg (174 lb)   SpO2 98%   BMI 24.27

## 2024-05-06 ENCOUNTER — PATIENT MESSAGE (OUTPATIENT)
Dept: PRIMARY CARE CLINIC | Age: 69
End: 2024-05-06

## 2024-05-06 DIAGNOSIS — E78.00 PURE HYPERCHOLESTEROLEMIA: Chronic | ICD-10-CM

## 2024-05-07 RX ORDER — ATORVASTATIN CALCIUM 10 MG/1
10 TABLET, FILM COATED ORAL DAILY
Qty: 90 TABLET | Refills: 1 | Status: SHIPPED | OUTPATIENT
Start: 2024-05-07

## 2024-05-07 NOTE — TELEPHONE ENCOUNTER
From: Michael Canales  To: Dr. Jose Mendes  Sent: 2024 3:29 PM EDT  Subject: Atorvastatin prescription    The refills have  on my Atorvastatin medication. I have 10 pills remaining until out of the medication. I spoke with my [facility] pharmacy technician this morning , and they indicated they sent two requests last week, but no response.

## 2024-05-07 NOTE — TELEPHONE ENCOUNTER
LastVisit 2/6/2024     NextVisit Visit date not found     Pharmacy:    [id number] - [place], [place] - [address] [phone] - F [phone]  [address] [place] [address]  Phone: [phone] Fax: [phone]     pharmacy confirmed in EPIC

## 2024-07-25 ENCOUNTER — PATIENT MESSAGE (OUTPATIENT)
Dept: PRIMARY CARE CLINIC | Age: 69
End: 2024-07-25

## 2024-07-25 NOTE — TELEPHONE ENCOUNTER
From: Michael Canales  To: Dr. Jose Mendes  Sent: 7/25/2024 8:02 AM EDT  Subject: Pre-Op Physical    I have a Robot-assisted laparoscopic simple prostatectomy scheduled for 9/26/24. The urologist/surgeon's office requires a pre-op physical. I currently have a Medicare Wellness appointment scheduled for 8/16/24. Can we cancel the wellness visit and replace with the pre-op physical? I will also have all my labs/blood work completed and submitted to your office for that visit.

## 2024-08-15 LAB
ALBUMIN: 4.5 G/DL
ALP BLD-CCNC: 52 U/L
ALT SERPL-CCNC: 31 U/L
ANION GAP SERPL CALCULATED.3IONS-SCNC: NORMAL MMOL/L
AST SERPL-CCNC: 41 U/L
BASOPHILS ABSOLUTE: 0.1 /ΜL
BASOPHILS RELATIVE PERCENT: 1 %
BILIRUB SERPL-MCNC: 0.9 MG/DL (ref 0.1–1.4)
BUN BLDV-MCNC: 22 MG/DL
CALCIUM SERPL-MCNC: 9.9 MG/DL
CHLORIDE BLD-SCNC: 104 MMOL/L
CHOLESTEROL, TOTAL: 162 MG/DL
CHOLESTEROL/HDL RATIO: ABNORMAL
CO2: 24 MMOL/L
CREAT SERPL-MCNC: 1.25 MG/DL
EOSINOPHILS ABSOLUTE: 0.3 /ΜL
EOSINOPHILS RELATIVE PERCENT: 6 %
GFR, ESTIMATED: 62
GLUCOSE BLD-MCNC: 92 MG/DL
HCT VFR BLD CALC: 47.5 % (ref 41–53)
HDLC SERPL-MCNC: 71 MG/DL (ref 35–70)
HEMOGLOBIN: 15.1 G/DL (ref 13.5–17.5)
LDL CHOLESTEROL: 79
LYMPHOCYTES ABSOLUTE: 1.3 /ΜL
LYMPHOCYTES RELATIVE PERCENT: 30 %
MAGNESIUM: 2.3 MG/DL
MCH RBC QN AUTO: 30.3 PG
MCHC RBC AUTO-ENTMCNC: 31.8 G/DL
MCV RBC AUTO: 95 FL
MONOCYTES ABSOLUTE: 0.3 /ΜL
MONOCYTES RELATIVE PERCENT: 8 %
NEUTROPHILS ABSOLUTE: 2.3 /ΜL
NEUTROPHILS RELATIVE PERCENT: 55 %
NONHDLC SERPL-MCNC: ABNORMAL MG/DL
PLATELET # BLD: 196 K/ΜL
PMV BLD AUTO: NORMAL FL
POTASSIUM SERPL-SCNC: 4.6 MMOL/L
PROSTATE SPECIFIC ANTIGEN: 2.2 NG/ML
RBC # BLD: 4.98 10^6/ΜL
SODIUM BLD-SCNC: 145 MMOL/L
TOTAL PROTEIN: 6.5 G/DL (ref 6.4–8.2)
TRIGL SERPL-MCNC: 60 MG/DL
VITAMIN D 25-HYDROXY: 62
VITAMIN D2, 25 HYDROXY: NORMAL
VITAMIN D3,25 HYDROXY: NORMAL
VLDLC SERPL CALC-MCNC: 12 MG/DL
WBC # BLD: 4.2 10^3/ML

## 2024-08-16 ENCOUNTER — TELEPHONE (OUTPATIENT)
Dept: PRIMARY CARE CLINIC | Age: 69
End: 2024-08-16

## 2024-08-16 NOTE — TELEPHONE ENCOUNTER
Neelam from  Dr.Terry Cordova  #[phone] calling states she already called this morning and was inquiring status of her request. (Per Neelam she did not asked their name-who ever took the message)  Neelam is requesting pt's most recent lab results to be faxed to [phone]. Per Neelam GI needs to order new labs and this is why recent lab results are needed.

## 2024-08-28 RX ORDER — FINASTERIDE 5 MG/1
5 TABLET, FILM COATED ORAL DAILY
Status: ON HOLD | COMMUNITY
End: 2024-09-26

## 2024-08-28 NOTE — PROGRESS NOTES
DATE _9/26__      PLACE _x__ [address]                          TIME _1230__                                             ___ [address]                           Arrival _1100__     Surgeons office to give time ___      Surgery dates,times and arrival times are subject to change.Please check with your surgeon.  Bring a photo I.D.,insurance card and form of payment if you have a co pay.  Plan for someone 18 years or older to stay on site while you are her and to take you home after surgery.You are not permitted to drive yourself home. You cannot leave via Uber, Lyft, Taxi or Medical Transport by yourself. You must have someone with you. It is strongly suggested that someone stay with you the first 24 hours after anesthesia. Your surgery will be cancelled if you do not have a ride home. A parent or legal guardian guardian must stay with a child until the child is discharged. Please do not bring other children.  A History/Physical is required to be completed and dated within 30 days of your surgery. To be done by PCP 9/11__ Surgeon ___or Hospitalist ___  You may be required to get labs x__ EKG _x_ or a chest Xrayx ___ prior to surgery. To be done by __9/16__  Nothing to eat or drink after midnight the evening prior to surgery.  If your surgeon requires a bowel prep, follow their instructions.  You may brush your teeth.  Bring a complete list of your medications including vitamins and supplement with the name,dose and frequency.  Take the following medications with a sip of water the AM of surgery __protonix__________________________________   DR Vazquez patients do not take any medications the AM of surgery.  If you can not be reached by phone to give specific medication instructions,you may use your inhalers,take your heart, blood pressure,seizure and thyroid medications with a sip of water the AM of surgery. Bring your rescue inhaler with you if you use one.  DO NOT take blood pressure medications ending in

## 2024-09-11 ENCOUNTER — HOSPITAL ENCOUNTER (OUTPATIENT)
Age: 69
Discharge: HOME | End: 2024-09-11
Payer: MEDICARE

## 2024-09-11 ENCOUNTER — OFFICE VISIT (OUTPATIENT)
Dept: PRIMARY CARE CLINIC | Age: 69
End: 2024-09-11

## 2024-09-11 ENCOUNTER — HOSPITAL ENCOUNTER (OUTPATIENT)
Dept: GENERAL RADIOLOGY | Age: 69
Discharge: HOME | End: 2024-09-11
Payer: MEDICARE

## 2024-09-11 VITALS
OXYGEN SATURATION: 98 % | SYSTOLIC BLOOD PRESSURE: 128 MMHG | HEART RATE: 84 BPM | BODY MASS INDEX: 23.57 KG/M2 | WEIGHT: 169 LBS | DIASTOLIC BLOOD PRESSURE: 76 MMHG

## 2024-09-11 DIAGNOSIS — Z01.818 PREOP EXAMINATION: Primary | ICD-10-CM

## 2024-09-11 DIAGNOSIS — Z01.818 PREOP TESTING: ICD-10-CM

## 2024-09-11 LAB
ABO + RH BLD: NORMAL
APTT BLD: 30.8 SEC (ref 22.1–36.4)
BLD GP AB SCN SERPL QL: NORMAL
EKG ATRIAL RATE: 69 BPM
EKG DIAGNOSIS: NORMAL
EKG P AXIS: 68 DEGREES
EKG P-R INTERVAL: 176 MS
EKG Q-T INTERVAL: 408 MS
EKG QRS DURATION: 142 MS
EKG QTC CALCULATION (BAZETT): 437 MS
EKG R AXIS: 34 DEGREES
EKG T AXIS: 31 DEGREES
EKG VENTRICULAR RATE: 69 BPM
INR PPP: 0.99 (ref 0.85–1.15)
PROTHROMBIN TIME: 13.3 SEC (ref 11.9–14.9)

## 2024-09-11 PROCEDURE — 85730 THROMBOPLASTIN TIME PARTIAL: CPT

## 2024-09-11 PROCEDURE — 86850 RBC ANTIBODY SCREEN: CPT

## 2024-09-11 PROCEDURE — 86900 BLOOD TYPING SEROLOGIC ABO: CPT

## 2024-09-11 PROCEDURE — 36415 COLL VENOUS BLD VENIPUNCTURE: CPT

## 2024-09-11 PROCEDURE — 71046 X-RAY EXAM CHEST 2 VIEWS: CPT

## 2024-09-11 PROCEDURE — 93010 ELECTROCARDIOGRAM REPORT: CPT | Performed by: INTERNAL MEDICINE

## 2024-09-11 PROCEDURE — 85610 PROTHROMBIN TIME: CPT

## 2024-09-11 PROCEDURE — 93005 ELECTROCARDIOGRAM TRACING: CPT | Performed by: UROLOGY

## 2024-09-11 PROCEDURE — 86901 BLOOD TYPING SEROLOGIC RH(D): CPT

## 2024-09-11 RX ORDER — LOPERAMIDE HYDROCHLORIDE 2 MG/1
1 TABLET ORAL DAILY
COMMUNITY
Start: 2020-02-03

## 2024-09-11 RX ORDER — ASPIRIN 81 MG/1
81 TABLET ORAL DAILY
COMMUNITY
Start: 2012-01-01 | End: 2024-09-11 | Stop reason: ALTCHOICE

## 2024-09-11 SDOH — ECONOMIC STABILITY: FOOD INSECURITY: WITHIN THE PAST 12 MONTHS, YOU WORRIED THAT YOUR FOOD WOULD RUN OUT BEFORE YOU GOT MONEY TO BUY MORE.: NEVER TRUE

## 2024-09-11 SDOH — ECONOMIC STABILITY: FOOD INSECURITY: WITHIN THE PAST 12 MONTHS, THE FOOD YOU BOUGHT JUST DIDN'T LAST AND YOU DIDN'T HAVE MONEY TO GET MORE.: NEVER TRUE

## 2024-09-11 SDOH — ECONOMIC STABILITY: INCOME INSECURITY: HOW HARD IS IT FOR YOU TO PAY FOR THE VERY BASICS LIKE FOOD, HOUSING, MEDICAL CARE, AND HEATING?: NOT HARD AT ALL

## 2024-09-11 ASSESSMENT — PATIENT HEALTH QUESTIONNAIRE - PHQ9
SUM OF ALL RESPONSES TO PHQ9 QUESTIONS 1 & 2: 0
SUM OF ALL RESPONSES TO PHQ QUESTIONS 1-9: 0
1. LITTLE INTEREST OR PLEASURE IN DOING THINGS: NOT AT ALL
2. FEELING DOWN, DEPRESSED OR HOPELESS: NOT AT ALL

## 2024-09-26 ENCOUNTER — ANESTHESIA EVENT (OUTPATIENT)
Dept: OPERATING ROOM | Age: 69
End: 2024-09-26
Payer: MEDICARE

## 2024-09-26 ENCOUNTER — ANESTHESIA (OUTPATIENT)
Dept: OPERATING ROOM | Age: 69
End: 2024-09-26
Payer: MEDICARE

## 2024-09-26 ENCOUNTER — HOSPITAL ENCOUNTER (OUTPATIENT)
Age: 69
Setting detail: OBSERVATION
Discharge: HOME | End: 2024-09-27
Attending: UROLOGY | Admitting: UROLOGY
Payer: MEDICARE

## 2024-09-26 DIAGNOSIS — N13.8 BENIGN PROSTATIC HYPERPLASIA WITH URINARY OBSTRUCTION: ICD-10-CM

## 2024-09-26 DIAGNOSIS — N40.1 BENIGN PROSTATIC HYPERPLASIA WITH LOWER URINARY TRACT SYMPTOMS, SYMPTOM DETAILS UNSPECIFIED: ICD-10-CM

## 2024-09-26 DIAGNOSIS — Z01.818 PREOP TESTING: Primary | ICD-10-CM

## 2024-09-26 DIAGNOSIS — N40.1 BENIGN PROSTATIC HYPERPLASIA WITH URINARY OBSTRUCTION: ICD-10-CM

## 2024-09-26 LAB
ABO + RH BLD: NORMAL
BLD GP AB SCN SERPL QL: NORMAL

## 2024-09-26 PROCEDURE — 6370000000 HC RX 637 (ALT 250 FOR IP): Performed by: UROLOGY

## 2024-09-26 PROCEDURE — S2900 ROBOTIC SURGICAL SYSTEM: HCPCS | Performed by: UROLOGY

## 2024-09-26 PROCEDURE — 3700000001 HC ADD 15 MINUTES (ANESTHESIA): Performed by: UROLOGY

## 2024-09-26 PROCEDURE — 94150 VITAL CAPACITY TEST: CPT

## 2024-09-26 PROCEDURE — 6360000002 HC RX W HCPCS: Performed by: ANESTHESIOLOGY

## 2024-09-26 PROCEDURE — 2580000003 HC RX 258: Performed by: UROLOGY

## 2024-09-26 PROCEDURE — 2500000003 HC RX 250 WO HCPCS: Performed by: NURSE ANESTHETIST, CERTIFIED REGISTERED

## 2024-09-26 PROCEDURE — 3600000019 HC SURGERY ROBOT ADDTL 15MIN: Performed by: UROLOGY

## 2024-09-26 PROCEDURE — 6360000002 HC RX W HCPCS: Performed by: NURSE ANESTHETIST, CERTIFIED REGISTERED

## 2024-09-26 PROCEDURE — A4217 STERILE WATER/SALINE, 500 ML: HCPCS | Performed by: UROLOGY

## 2024-09-26 PROCEDURE — G0378 HOSPITAL OBSERVATION PER HR: HCPCS

## 2024-09-26 PROCEDURE — 86900 BLOOD TYPING SEROLOGIC ABO: CPT

## 2024-09-26 PROCEDURE — 2700000000 HC OXYGEN THERAPY PER DAY

## 2024-09-26 PROCEDURE — 86850 RBC ANTIBODY SCREEN: CPT

## 2024-09-26 PROCEDURE — 3700000000 HC ANESTHESIA ATTENDED CARE: Performed by: UROLOGY

## 2024-09-26 PROCEDURE — 7100000000 HC PACU RECOVERY - FIRST 15 MIN: Performed by: UROLOGY

## 2024-09-26 PROCEDURE — 6360000002 HC RX W HCPCS: Performed by: UROLOGY

## 2024-09-26 PROCEDURE — 2580000003 HC RX 258: Performed by: NURSE ANESTHETIST, CERTIFIED REGISTERED

## 2024-09-26 PROCEDURE — 3600000009 HC SURGERY ROBOT BASE: Performed by: UROLOGY

## 2024-09-26 PROCEDURE — C9290 INJ, BUPIVACAINE LIPOSOME: HCPCS | Performed by: UROLOGY

## 2024-09-26 PROCEDURE — 86901 BLOOD TYPING SEROLOGIC RH(D): CPT

## 2024-09-26 PROCEDURE — 88307 TISSUE EXAM BY PATHOLOGIST: CPT

## 2024-09-26 PROCEDURE — 2709999900 HC NON-CHARGEABLE SUPPLY: Performed by: UROLOGY

## 2024-09-26 PROCEDURE — 7100000001 HC PACU RECOVERY - ADDTL 15 MIN: Performed by: UROLOGY

## 2024-09-26 PROCEDURE — 36415 COLL VENOUS BLD VENIPUNCTURE: CPT

## 2024-09-26 RX ORDER — GINSENG 100 MG
CAPSULE ORAL 2 TIMES DAILY
Status: DISCONTINUED | OUTPATIENT
Start: 2024-09-26 | End: 2024-09-27 | Stop reason: HOSPADM

## 2024-09-26 RX ORDER — CHOLECALCIFEROL (VITAMIN D3) 125 MCG
3000 CAPSULE ORAL 3 TIMES DAILY PRN
Status: DISCONTINUED | OUTPATIENT
Start: 2024-09-26 | End: 2024-09-26 | Stop reason: RX

## 2024-09-26 RX ORDER — ONDANSETRON 2 MG/ML
INJECTION INTRAMUSCULAR; INTRAVENOUS
Status: DISCONTINUED | OUTPATIENT
Start: 2024-09-26 | End: 2024-09-26 | Stop reason: SDUPTHER

## 2024-09-26 RX ORDER — CHOLECALCIFEROL (VITAMIN D3) 125 MCG
9000 CAPSULE ORAL 3 TIMES DAILY PRN
Status: DISCONTINUED | OUTPATIENT
Start: 2024-09-26 | End: 2024-09-26

## 2024-09-26 RX ORDER — FENTANYL CITRATE 50 UG/ML
25 INJECTION, SOLUTION INTRAMUSCULAR; INTRAVENOUS EVERY 5 MIN PRN
Status: COMPLETED | OUTPATIENT
Start: 2024-09-26 | End: 2024-09-26

## 2024-09-26 RX ORDER — SODIUM CHLORIDE 0.9 % (FLUSH) 0.9 %
5-40 SYRINGE (ML) INJECTION EVERY 12 HOURS SCHEDULED
Status: DISCONTINUED | OUTPATIENT
Start: 2024-09-26 | End: 2024-09-26 | Stop reason: HOSPADM

## 2024-09-26 RX ORDER — MORPHINE SULFATE 2 MG/ML
2 INJECTION, SOLUTION INTRAMUSCULAR; INTRAVENOUS
Status: DISCONTINUED | OUTPATIENT
Start: 2024-09-26 | End: 2024-09-27 | Stop reason: HOSPADM

## 2024-09-26 RX ORDER — HYDROMORPHONE HYDROCHLORIDE 2 MG/ML
0.5 INJECTION, SOLUTION INTRAMUSCULAR; INTRAVENOUS; SUBCUTANEOUS EVERY 5 MIN PRN
Status: DISCONTINUED | OUTPATIENT
Start: 2024-09-26 | End: 2024-09-26 | Stop reason: HOSPADM

## 2024-09-26 RX ORDER — SODIUM CHLORIDE, SODIUM LACTATE, POTASSIUM CHLORIDE, CALCIUM CHLORIDE 600; 310; 30; 20 MG/100ML; MG/100ML; MG/100ML; MG/100ML
INJECTION, SOLUTION INTRAVENOUS CONTINUOUS
Status: DISCONTINUED | OUTPATIENT
Start: 2024-09-26 | End: 2024-09-26 | Stop reason: HOSPADM

## 2024-09-26 RX ORDER — BUPIVACAINE HYDROCHLORIDE 5 MG/ML
INJECTION, SOLUTION EPIDURAL; INTRACAUDAL
Status: COMPLETED | OUTPATIENT
Start: 2024-09-26 | End: 2024-09-26

## 2024-09-26 RX ORDER — HYDROCODONE BITARTRATE AND ACETAMINOPHEN 5; 325 MG/1; MG/1
1 TABLET ORAL EVERY 6 HOURS PRN
Qty: 12 TABLET | Refills: 0 | Status: SHIPPED | OUTPATIENT
Start: 2024-09-26 | End: 2024-09-29

## 2024-09-26 RX ORDER — PHENYLEPHRINE HCL IN 0.9% NACL 1 MG/10 ML
SYRINGE (ML) INTRAVENOUS
Status: DISCONTINUED | OUTPATIENT
Start: 2024-09-26 | End: 2024-09-26 | Stop reason: SDUPTHER

## 2024-09-26 RX ORDER — HYDRALAZINE HYDROCHLORIDE 20 MG/ML
10 INJECTION INTRAMUSCULAR; INTRAVENOUS
Status: DISCONTINUED | OUTPATIENT
Start: 2024-09-26 | End: 2024-09-26 | Stop reason: HOSPADM

## 2024-09-26 RX ORDER — AMOXICILLIN 250 MG
1 CAPSULE ORAL 2 TIMES DAILY
Qty: 50 TABLET | Refills: 0 | Status: SHIPPED | OUTPATIENT
Start: 2024-09-26

## 2024-09-26 RX ORDER — TROSPIUM CHLORIDE 20 MG/1
20 TABLET, FILM COATED ORAL
Status: DISCONTINUED | OUTPATIENT
Start: 2024-09-26 | End: 2024-09-27 | Stop reason: HOSPADM

## 2024-09-26 RX ORDER — PROPOFOL 10 MG/ML
INJECTION, EMULSION INTRAVENOUS
Status: DISCONTINUED | OUTPATIENT
Start: 2024-09-26 | End: 2024-09-26 | Stop reason: SDUPTHER

## 2024-09-26 RX ORDER — BISACODYL 10 MG
10 SUPPOSITORY, RECTAL RECTAL DAILY PRN
Status: DISCONTINUED | OUTPATIENT
Start: 2024-09-26 | End: 2024-09-27 | Stop reason: HOSPADM

## 2024-09-26 RX ORDER — SODIUM CHLORIDE, SODIUM LACTATE, POTASSIUM CHLORIDE, CALCIUM CHLORIDE 600; 310; 30; 20 MG/100ML; MG/100ML; MG/100ML; MG/100ML
INJECTION, SOLUTION INTRAVENOUS
Status: DISCONTINUED | OUTPATIENT
Start: 2024-09-26 | End: 2024-09-26 | Stop reason: SDUPTHER

## 2024-09-26 RX ORDER — LIDOCAINE HYDROCHLORIDE 10 MG/ML
0.5 INJECTION, SOLUTION EPIDURAL; INFILTRATION; INTRACAUDAL; PERINEURAL ONCE
Status: DISCONTINUED | OUTPATIENT
Start: 2024-09-26 | End: 2024-09-26 | Stop reason: HOSPADM

## 2024-09-26 RX ORDER — ONDANSETRON 2 MG/ML
4 INJECTION INTRAMUSCULAR; INTRAVENOUS
Status: DISCONTINUED | OUTPATIENT
Start: 2024-09-26 | End: 2024-09-26 | Stop reason: HOSPADM

## 2024-09-26 RX ORDER — OXYCODONE HYDROCHLORIDE 5 MG/1
5 TABLET ORAL EVERY 4 HOURS PRN
Status: DISCONTINUED | OUTPATIENT
Start: 2024-09-26 | End: 2024-09-27 | Stop reason: HOSPADM

## 2024-09-26 RX ORDER — OXYCODONE HYDROCHLORIDE 5 MG/1
10 TABLET ORAL PRN
Status: DISCONTINUED | OUTPATIENT
Start: 2024-09-26 | End: 2024-09-26 | Stop reason: HOSPADM

## 2024-09-26 RX ORDER — PROCHLORPERAZINE EDISYLATE 5 MG/ML
5 INJECTION INTRAMUSCULAR; INTRAVENOUS
Status: DISCONTINUED | OUTPATIENT
Start: 2024-09-26 | End: 2024-09-26 | Stop reason: HOSPADM

## 2024-09-26 RX ORDER — OXYCODONE HYDROCHLORIDE 5 MG/1
10 TABLET ORAL EVERY 4 HOURS PRN
Status: DISCONTINUED | OUTPATIENT
Start: 2024-09-26 | End: 2024-09-27 | Stop reason: HOSPADM

## 2024-09-26 RX ORDER — SODIUM CHLORIDE 0.9 % (FLUSH) 0.9 %
5-40 SYRINGE (ML) INJECTION EVERY 12 HOURS SCHEDULED
Status: DISCONTINUED | OUTPATIENT
Start: 2024-09-26 | End: 2024-09-27 | Stop reason: HOSPADM

## 2024-09-26 RX ORDER — ONDANSETRON 2 MG/ML
4 INJECTION INTRAMUSCULAR; INTRAVENOUS EVERY 6 HOURS PRN
Status: DISCONTINUED | OUTPATIENT
Start: 2024-09-26 | End: 2024-09-27 | Stop reason: HOSPADM

## 2024-09-26 RX ORDER — DEXAMETHASONE SODIUM PHOSPHATE 4 MG/ML
INJECTION, SOLUTION INTRA-ARTICULAR; INTRALESIONAL; INTRAMUSCULAR; INTRAVENOUS; SOFT TISSUE
Status: DISCONTINUED | OUTPATIENT
Start: 2024-09-26 | End: 2024-09-26 | Stop reason: SDUPTHER

## 2024-09-26 RX ORDER — ROCURONIUM BROMIDE 10 MG/ML
INJECTION, SOLUTION INTRAVENOUS
Status: DISCONTINUED | OUTPATIENT
Start: 2024-09-26 | End: 2024-09-26 | Stop reason: SDUPTHER

## 2024-09-26 RX ORDER — SODIUM CHLORIDE, SODIUM LACTATE, POTASSIUM CHLORIDE, CALCIUM CHLORIDE 600; 310; 30; 20 MG/100ML; MG/100ML; MG/100ML; MG/100ML
INJECTION, SOLUTION INTRAVENOUS CONTINUOUS
Status: DISCONTINUED | OUTPATIENT
Start: 2024-09-26 | End: 2024-09-27 | Stop reason: HOSPADM

## 2024-09-26 RX ORDER — NALOXONE HYDROCHLORIDE 0.4 MG/ML
INJECTION, SOLUTION INTRAMUSCULAR; INTRAVENOUS; SUBCUTANEOUS PRN
Status: DISCONTINUED | OUTPATIENT
Start: 2024-09-26 | End: 2024-09-26 | Stop reason: HOSPADM

## 2024-09-26 RX ORDER — LIDOCAINE HYDROCHLORIDE 20 MG/ML
JELLY TOPICAL
Status: COMPLETED | OUTPATIENT
Start: 2024-09-26 | End: 2024-09-26

## 2024-09-26 RX ORDER — ATORVASTATIN CALCIUM 10 MG/1
10 TABLET, FILM COATED ORAL DAILY
Status: DISCONTINUED | OUTPATIENT
Start: 2024-09-27 | End: 2024-09-27 | Stop reason: HOSPADM

## 2024-09-26 RX ORDER — HYDROMORPHONE HYDROCHLORIDE 2 MG/ML
INJECTION, SOLUTION INTRAMUSCULAR; INTRAVENOUS; SUBCUTANEOUS
Status: DISCONTINUED | OUTPATIENT
Start: 2024-09-26 | End: 2024-09-26 | Stop reason: SDUPTHER

## 2024-09-26 RX ORDER — MORPHINE SULFATE 4 MG/ML
4 INJECTION, SOLUTION INTRAMUSCULAR; INTRAVENOUS
Status: DISCONTINUED | OUTPATIENT
Start: 2024-09-26 | End: 2024-09-27 | Stop reason: HOSPADM

## 2024-09-26 RX ORDER — SULFAMETHOXAZOLE/TRIMETHOPRIM 800-160 MG
1 TABLET ORAL DAILY
Qty: 30 TABLET | Refills: 0 | Status: SHIPPED | OUTPATIENT
Start: 2024-09-26 | End: 2024-10-26

## 2024-09-26 RX ORDER — OXYCODONE HYDROCHLORIDE 5 MG/1
5 TABLET ORAL PRN
Status: DISCONTINUED | OUTPATIENT
Start: 2024-09-26 | End: 2024-09-26 | Stop reason: HOSPADM

## 2024-09-26 RX ORDER — ONDANSETRON 4 MG/1
4 TABLET, ORALLY DISINTEGRATING ORAL EVERY 8 HOURS PRN
Status: DISCONTINUED | OUTPATIENT
Start: 2024-09-26 | End: 2024-09-27 | Stop reason: HOSPADM

## 2024-09-26 RX ORDER — SODIUM CHLORIDE 0.9 % (FLUSH) 0.9 %
5-40 SYRINGE (ML) INJECTION PRN
Status: DISCONTINUED | OUTPATIENT
Start: 2024-09-26 | End: 2024-09-26 | Stop reason: HOSPADM

## 2024-09-26 RX ORDER — MAGNESIUM HYDROXIDE 1200 MG/15ML
LIQUID ORAL CONTINUOUS PRN
Status: COMPLETED | OUTPATIENT
Start: 2024-09-26 | End: 2024-09-26

## 2024-09-26 RX ORDER — LIDOCAINE HYDROCHLORIDE 20 MG/ML
INJECTION, SOLUTION EPIDURAL; INFILTRATION; INTRACAUDAL; PERINEURAL
Status: DISCONTINUED | OUTPATIENT
Start: 2024-09-26 | End: 2024-09-26 | Stop reason: SDUPTHER

## 2024-09-26 RX ORDER — SODIUM CHLORIDE 0.9 % (FLUSH) 0.9 %
5-40 SYRINGE (ML) INJECTION PRN
Status: DISCONTINUED | OUTPATIENT
Start: 2024-09-26 | End: 2024-09-27 | Stop reason: HOSPADM

## 2024-09-26 RX ORDER — DEXMEDETOMIDINE HYDROCHLORIDE 100 UG/ML
INJECTION, SOLUTION INTRAVENOUS
Status: DISCONTINUED | OUTPATIENT
Start: 2024-09-26 | End: 2024-09-26 | Stop reason: SDUPTHER

## 2024-09-26 RX ORDER — SENNA AND DOCUSATE SODIUM 50; 8.6 MG/1; MG/1
1 TABLET, FILM COATED ORAL 2 TIMES DAILY
Status: DISCONTINUED | OUTPATIENT
Start: 2024-09-26 | End: 2024-09-27 | Stop reason: HOSPADM

## 2024-09-26 RX ORDER — SODIUM CHLORIDE 9 MG/ML
INJECTION, SOLUTION INTRAVENOUS PRN
Status: DISCONTINUED | OUTPATIENT
Start: 2024-09-26 | End: 2024-09-26 | Stop reason: HOSPADM

## 2024-09-26 RX ORDER — SODIUM CHLORIDE 9 MG/ML
INJECTION, SOLUTION INTRAVENOUS PRN
Status: DISCONTINUED | OUTPATIENT
Start: 2024-09-26 | End: 2024-09-27 | Stop reason: HOSPADM

## 2024-09-26 RX ORDER — METHOCARBAMOL 100 MG/ML
INJECTION, SOLUTION INTRAMUSCULAR; INTRAVENOUS
Status: DISCONTINUED | OUTPATIENT
Start: 2024-09-26 | End: 2024-09-26 | Stop reason: SDUPTHER

## 2024-09-26 RX ORDER — FENTANYL CITRATE 50 UG/ML
INJECTION, SOLUTION INTRAMUSCULAR; INTRAVENOUS
Status: DISCONTINUED | OUTPATIENT
Start: 2024-09-26 | End: 2024-09-26 | Stop reason: SDUPTHER

## 2024-09-26 RX ORDER — ACETAMINOPHEN 325 MG/1
650 TABLET ORAL EVERY 6 HOURS
Status: DISCONTINUED | OUTPATIENT
Start: 2024-09-26 | End: 2024-09-27 | Stop reason: HOSPADM

## 2024-09-26 RX ORDER — CHOLECALCIFEROL (VITAMIN D3) 125 MCG
6000 CAPSULE ORAL
Status: DISCONTINUED | OUTPATIENT
Start: 2024-09-26 | End: 2024-09-26

## 2024-09-26 RX ORDER — PANTOPRAZOLE SODIUM 40 MG/1
40 TABLET, DELAYED RELEASE ORAL DAILY
Status: DISCONTINUED | OUTPATIENT
Start: 2024-09-27 | End: 2024-09-27 | Stop reason: HOSPADM

## 2024-09-26 RX ORDER — LABETALOL HYDROCHLORIDE 5 MG/ML
10 INJECTION, SOLUTION INTRAVENOUS
Status: DISCONTINUED | OUTPATIENT
Start: 2024-09-26 | End: 2024-09-26 | Stop reason: HOSPADM

## 2024-09-26 RX ADMIN — CEFTRIAXONE SODIUM 1000 MG: 1 INJECTION, POWDER, FOR SOLUTION INTRAMUSCULAR; INTRAVENOUS at 12:53

## 2024-09-26 RX ADMIN — ACETAMINOPHEN 650 MG: 325 TABLET ORAL at 18:12

## 2024-09-26 RX ADMIN — ROCURONIUM BROMIDE 20 MG: 10 INJECTION, SOLUTION INTRAVENOUS at 13:15

## 2024-09-26 RX ADMIN — BACITRACIN: 500 OINTMENT TOPICAL at 21:52

## 2024-09-26 RX ADMIN — HYDROMORPHONE HYDROCHLORIDE 0.5 MG: 2 INJECTION, SOLUTION INTRAMUSCULAR; INTRAVENOUS; SUBCUTANEOUS at 14:22

## 2024-09-26 RX ADMIN — SUGAMMADEX 200 MG: 100 INJECTION, SOLUTION INTRAVENOUS at 14:50

## 2024-09-26 RX ADMIN — SODIUM CHLORIDE, POTASSIUM CHLORIDE, SODIUM LACTATE AND CALCIUM CHLORIDE: 600; 310; 30; 20 INJECTION, SOLUTION INTRAVENOUS at 12:38

## 2024-09-26 RX ADMIN — DEXMEDETOMIDINE HYDROCHLORIDE 10 MCG: 100 INJECTION, SOLUTION INTRAVENOUS at 15:01

## 2024-09-26 RX ADMIN — Medication 100 MCG: at 14:49

## 2024-09-26 RX ADMIN — ACETAMINOPHEN 650 MG: 325 TABLET ORAL at 21:53

## 2024-09-26 RX ADMIN — FENTANYL CITRATE 50 MCG: 50 INJECTION, SOLUTION INTRAMUSCULAR; INTRAVENOUS at 12:43

## 2024-09-26 RX ADMIN — SODIUM CHLORIDE, PRESERVATIVE FREE 10 ML: 5 INJECTION INTRAVENOUS at 21:55

## 2024-09-26 RX ADMIN — Medication 100 MCG: at 14:42

## 2024-09-26 RX ADMIN — PHENYLEPHRINE HYDROCHLORIDE 30 MCG/MIN: 10 INJECTION INTRAVENOUS at 13:25

## 2024-09-26 RX ADMIN — DEXMEDETOMIDINE HYDROCHLORIDE 10 MCG: 100 INJECTION, SOLUTION INTRAVENOUS at 15:08

## 2024-09-26 RX ADMIN — DEXAMETHASONE SODIUM PHOSPHATE 8 MG: 4 INJECTION, SOLUTION INTRAMUSCULAR; INTRAVENOUS at 13:12

## 2024-09-26 RX ADMIN — LIDOCAINE HYDROCHLORIDE 100 MG: 20 INJECTION, SOLUTION EPIDURAL; INFILTRATION; INTRACAUDAL; PERINEURAL at 12:44

## 2024-09-26 RX ADMIN — SODIUM CHLORIDE, PRESERVATIVE FREE 10 ML: 5 INJECTION INTRAVENOUS at 21:54

## 2024-09-26 RX ADMIN — PROPOFOL 180 MG: 10 INJECTION, EMULSION INTRAVENOUS at 12:44

## 2024-09-26 RX ADMIN — FENTANYL CITRATE 25 MCG: 0.05 INJECTION, SOLUTION INTRAMUSCULAR; INTRAVENOUS at 15:45

## 2024-09-26 RX ADMIN — ROCURONIUM BROMIDE 30 MG: 10 INJECTION, SOLUTION INTRAVENOUS at 13:55

## 2024-09-26 RX ADMIN — ONDANSETRON 4 MG: 2 INJECTION INTRAMUSCULAR; INTRAVENOUS at 13:12

## 2024-09-26 RX ADMIN — SODIUM CHLORIDE, POTASSIUM CHLORIDE, SODIUM LACTATE AND CALCIUM CHLORIDE: 600; 310; 30; 20 INJECTION, SOLUTION INTRAVENOUS at 11:36

## 2024-09-26 RX ADMIN — FENTANYL CITRATE 50 MCG: 50 INJECTION, SOLUTION INTRAMUSCULAR; INTRAVENOUS at 12:38

## 2024-09-26 RX ADMIN — METHOCARBAMOL 1000 MG: 100 INJECTION INTRAMUSCULAR; INTRAVENOUS at 13:28

## 2024-09-26 RX ADMIN — FENTANYL CITRATE 25 MCG: 0.05 INJECTION, SOLUTION INTRAMUSCULAR; INTRAVENOUS at 15:35

## 2024-09-26 RX ADMIN — SENNOSIDES, DOCUSATE SODIUM 1 TABLET: 50; 8.6 TABLET, FILM COATED ORAL at 21:53

## 2024-09-26 RX ADMIN — ROCURONIUM BROMIDE 50 MG: 10 INJECTION, SOLUTION INTRAVENOUS at 12:44

## 2024-09-26 RX ADMIN — FENTANYL CITRATE 25 MCG: 0.05 INJECTION, SOLUTION INTRAMUSCULAR; INTRAVENOUS at 15:27

## 2024-09-26 RX ADMIN — FENTANYL CITRATE 25 MCG: 0.05 INJECTION, SOLUTION INTRAMUSCULAR; INTRAVENOUS at 15:22

## 2024-09-26 ASSESSMENT — PAIN DESCRIPTION - LOCATION
LOCATION: PERINEUM
LOCATION: PERINEUM

## 2024-09-26 ASSESSMENT — PAIN - FUNCTIONAL ASSESSMENT: PAIN_FUNCTIONAL_ASSESSMENT: 0-10

## 2024-09-26 ASSESSMENT — PAIN SCALES - GENERAL
PAINLEVEL_OUTOF10: 2
PAINLEVEL_OUTOF10: 5
PAINLEVEL_OUTOF10: 5

## 2024-09-26 ASSESSMENT — PAIN DESCRIPTION - DESCRIPTORS
DESCRIPTORS: PRESSURE
DESCRIPTORS: PRESSURE

## 2024-09-26 ASSESSMENT — LIFESTYLE VARIABLES: SMOKING_STATUS: 0

## 2024-09-26 NOTE — PROGRESS NOTES
09/26/24 1756   Incentive Spirometry Tx   Predicted Volume 753  (Instruction given. Pt expressed understanding)

## 2024-09-26 NOTE — PROGRESS NOTES
Patient to PACU from OR. Awakens to voice. VSS. Abd soft, lap sites intact. Leon in place with CBI, urine clear pink. Will monitor.

## 2024-09-26 NOTE — H&P
Urology History and Physical  Inpatient Setting - [facility]    Provider: Juve Mera MD  Patient ID:  Admission Date: 2024 Name: Michael Canales  OR Date: n/a MRN: [id number]   Patient Location: OR/NONE : 1955  Attending: Juve Mera MD Date of Service: 2024  PCP: Jose Mendes MD     Diagnoses:  BPH w obstruction    Assessment/Plan:  Continue to the OR as planned for robotic simple prostatectomy    All the patients questions were answered in detail. He understands the plan as listed above.                                                                                                                                               _____________________________________________________________    CC: Pre-op    HPI: Michael Canales is a 69 y.o. male.  The history and physical exam was reviewed. The patient was seen and examined in pre-op. He had a chance to ask questions which were answered. There has been no interval changes. Plan to continue to the OR    Past Medical History:   He has a past medical history of GERD (gastroesophageal reflux disease) (10/05/2012), Hyperlipidemia, Irritable bowel syndrome (10/27/2019), Obstructive sleep apnea syndrome (2021), and Pure hypercholesterolemia (10/04/2012).    Past Surgical History:  He has a past surgical history that includes Neck surgery (); eye surgery ( and ); and Tonsillectomy.     Allergies:   No Known Allergies    Social History:  He reports that he has never smoked. He has never used smokeless tobacco. He reports that he does not drink alcohol and does not use drugs.    Family History:  family history includes Cancer in his paternal uncle; Depression in his mother; Diabetes in his father; Hearing Loss in his father; Heart Disease in his father; High Cholesterol in his father; Mult Sclerosis in his sister; Sleep Apnea in his son; Stroke in his maternal grandfather; Vision Loss in his father

## 2024-09-26 NOTE — DISCHARGE SUMMARY
Urology Discharge Summary  [facility]    Provider: Riley Payne PA-C Patient ID:  Admission Date: 2024 Name: Michael Canales  OR Date: 2024 MRN: [id number]   Patient Location: [id number] : 1955  Attending: Juve Mera MD Date of Service: 2024  PCP: Jose Mendes MD     Discharge date: 2024    Discharge Diagnoses:   BPH with obstruction    Reason for Hospitalization: Michael Canales is a 69 y.o. male who was admitted post surgery.    Operations/Procedures Performed:   1.  Robotic assisted laparoscopic simple prostatectomy    Discharge Condition:  Good    Hospital Course: The patient was admitted on 2024 and underwent the above mentioned procedure(s). He tolerated the procedure well with no apparent complications. Please see full operative report for further details regarding the operation. Postoperatively the patient remained in stable condition. Pain was controlled post-operatively with oral and IV medication. Diet was advanced and this was tolerated well.  At the time of discharge, the patient was tolerating oral food and hydration, was ambulating without difficulty, and pain was controlled on oral medications. The patient was determined to be suitable for discharge and the patient felt comfortable with that decision. Patient was discharged in good condition.     Consults: none     Significant Diagnostic findings: Labs, pathology is pending    Discharge Exam:  Blood pressure 130/82, pulse 86, temperature 98.2 °F (36.8 °C), temperature source Oral, resp. rate 16, height 1.803 m (5' 11\"), weight 74.5 kg (164 lb 4.8 oz), SpO2 96%.  Gen - NAD, AOx3  CV - RRR  Resp - CTAB  Abd - soft, NT/ND, inc c/d/i  Ext - warm, well-perfused    Disposition: Discharged home in good condition    Discharge Medications:  Current Discharge Medication List        START taking these medications    Details   sulfamethoxazole-trimethoprim (BACTRIM DS;SEPTRA DS)  800-160 MG per tablet Take 1 tablet by mouth daily  Qty: 30 tablet, Refills: 0      HYDROcodone-acetaminophen (NORCO) 5-325 MG per tablet Take 1 tablet by mouth every 6 hours as needed for Pain for up to 3 days. Intended supply: 3 days. Take lowest dose possible to manage pain Max Daily Amount: 4 tablets  Qty: 12 tablet, Refills: 0    Comments: Reduce doses taken as pain becomes manageable  Associated Diagnoses: Benign prostatic hyperplasia with urinary obstruction      senna-docusate (PERICOLACE) 8.6-50 MG per tablet Take 1 tablet by mouth 2 times daily  Qty: 50 tablet, Refills: 0           CONTINUE these medications which have NOT CHANGED    Details   loperamide (IMODIUM A-D) 2 MG tablet Take 0.5 tablets by mouth daily      atorvastatin (LIPITOR) 10 MG tablet Take 1 tablet by mouth daily  Qty: 90 tablet, Refills: 1    Associated Diagnoses: Pure hypercholesterolemia      pantoprazole (PROTONIX) 40 MG tablet TAKE 1 TABLET BY MOUTH ONCE DAILY    Associated Diagnoses: Gastroesophageal reflux disease without esophagitis      therapeutic multivitamin-minerals (THERAGRAN-M) tablet Take 1 tablet by mouth daily      Calcium Carb-Cholecalciferol 600-500 MG-UNIT CAPS Take 1 tablet by mouth daily.        triamcinolone (KENALOG) 0.1 % ointment APPLY TOPICALLY TO HANDS TWICE DAILY, AND TO FEET ONCE DAILY FOR 1 WEEK AS NEEDED      Omega-3 Fatty Acids (FISH OIL) 1200 MG CAPS Take 1,200 mg by mouth daily           STOP taking these medications       finasteride (PROSCAR) 5 MG tablet Comments:   Reason for Stopping:         tamsulosin (FLOMAX) 0.4 MG capsule Comments:   Reason for Stopping:         Saw Palmetto, Serenoa repens, 450 MG CAPS Comments:   Reason for Stopping:               Follow up Appointment:  Please call [phone] to schedule a follow up appointment with Dr. Mera in 1 weeks.    Riley Payne PA-C  9/27/2024

## 2024-09-26 NOTE — OP NOTE
Urology Operative Report  [facility]    Provider: Juve Mera MD  Patient ID:  Admission Date: 2024 Name: Michael Canales  OR Date: 2024  MRN: [id number]   Patient Location: OR/NONE : 1955  Attending: Juve Mera MD Date of Service: 2024  PCP: Jose Mendes MD     Date of Operation: 2024     Preoperative Diagnosis: BPH with urinary obstruction    Postoperative Diagnosis: same    Procedure:    1. Robotic assisted laparoscopic transvesical simple prostatectomy    Surgeon:   Juve Mera MD    Anesthesia: General endotracheal anesthesia    Indications: Michael Canales is a 69 y.o. male who presents for the above named surgery. Informed consent was obtained and the risks, benefits, and details of the procedure were explained to the patient who elected to proceed.    Details of Procedure:  After informed consent was obtained, making the patient aware of the risks, benefits and alternatives to the procedure, he was taken back to the surgical suite and positioned in a supine position on the surgical table. SCDs were placed on the lower extremities. General anesthesia was induced, and he was kept supine. All pressure points were carefully padded. His genitalia and abdomen were prepped and draped in the usual sterile fashion. A timeout procedure was performed, properly identifying the patient, procedure, and operative site. A stephens catheter was placed and the bladder was drained.      An 8mm incision was made supraumbilically, and a Veress needle was used to introduce pneumoperitoneum requiring one attempt(s). An 8mm camera port was then introduced into the abdomen, and the intra-abdominal contents were inspected for any sign of injury. There was none. The laparoscopic ports were placed in the usual fashion, two 8 mm ports in the left lower quadrant, one 8 mm port in the right lower quadrant, a 8mm assistant port in the lateral right lower quadrant.  The patient was put in steep Trendelenburg position and the robot was docked.    The laparoscopic instruments were introduced into the abdomen under direct vision. A transvesical approach to the prostate was used. The bladder was distended with irrigation and an incision was made in the peritoneum over the bladder dome. Once within the bladder the Leon balloon was deflated and an anatomic evaluation was done to identify the ureteral orifices and the prostate. An incision was made through the bladder mucosa circumferentially at the bladder neck. This was carried down until the smooth capsule of the prostate adenoma was identified. With traction outward on the prostate dissection is continued using Bovie and blunt dissection. At the apex level the catheter is in the urethra and the urethra is identified. The urethra is transected sharply. This allowed removed of the adenoma, which was placed into a specimen bag. Hemostasis in ensure using Bovie energy, and figure 8 sutures of 3-0 barbed suture at the 5 and 7 o'clock position. The posterior urethral mucosa is then re approximated to the bladder neck mucosa using a running 3-0 barbed suture. The bladder neck was repaired using running 3-0 barbed sutures from the superolateral corners of the bladder neck a progressing medially until the bladder neck appeared appropriated sized. 2% viscous lidocaine gel was instill via the urethra. The 3-way catheter is then inserted into the bladder and the balloon inflated under direct vision.     The cystotomy is closed using a running 3-0 barbed suture as mucosa and partial muscular closure, followed by a 3-0 barbed suture as a detrusor muscle closure, and then a running 3-0 barbed suture peritoneal closure. The bladder integrity is tested after closing each layer.  The specimen was extracted from the right upper quadrant incision passing the specimen pouch 3 times to remove the 3 segments of prostate adenoma.  This incision was then

## 2024-09-27 VITALS
WEIGHT: 164.3 LBS | SYSTOLIC BLOOD PRESSURE: 136 MMHG | TEMPERATURE: 98.8 F | BODY MASS INDEX: 23 KG/M2 | HEART RATE: 74 BPM | OXYGEN SATURATION: 92 % | DIASTOLIC BLOOD PRESSURE: 81 MMHG | RESPIRATION RATE: 16 BRPM | HEIGHT: 71 IN

## 2024-09-27 LAB
ANION GAP SERPL CALCULATED.3IONS-SCNC: 4 MMOL/L (ref 3–16)
BUN SERPL-MCNC: 20 MG/DL (ref 7–20)
CALCIUM SERPL-MCNC: 9.1 MG/DL (ref 8.3–10.6)
CHLORIDE SERPL-SCNC: 103 MMOL/L (ref 99–110)
CO2 SERPL-SCNC: 32 MMOL/L (ref 21–32)
CREAT SERPL-MCNC: 1.1 MG/DL (ref 0.8–1.3)
GFR SERPLBLD CREATININE-BSD FMLA CKD-EPI: 73 ML/MIN/{1.73_M2}
GLUCOSE SERPL-MCNC: 144 MG/DL (ref 70–99)
HCT VFR BLD AUTO: 38.7 % (ref 40.5–52.5)
HGB BLD-MCNC: 13.3 G/DL (ref 13.5–17.5)
POTASSIUM SERPL-SCNC: 4.7 MMOL/L (ref 3.5–5.1)
SODIUM SERPL-SCNC: 139 MMOL/L (ref 136–145)

## 2024-09-27 PROCEDURE — 6370000000 HC RX 637 (ALT 250 FOR IP): Performed by: UROLOGY

## 2024-09-27 PROCEDURE — 80048 BASIC METABOLIC PNL TOTAL CA: CPT

## 2024-09-27 PROCEDURE — 85014 HEMATOCRIT: CPT

## 2024-09-27 PROCEDURE — 85018 HEMOGLOBIN: CPT

## 2024-09-27 PROCEDURE — G0378 HOSPITAL OBSERVATION PER HR: HCPCS

## 2024-09-27 PROCEDURE — 36415 COLL VENOUS BLD VENIPUNCTURE: CPT

## 2024-09-27 RX ADMIN — ATORVASTATIN CALCIUM 10 MG: 10 TABLET, FILM COATED ORAL at 08:52

## 2024-09-27 RX ADMIN — OXYCODONE HYDROCHLORIDE 5 MG: 5 TABLET ORAL at 10:54

## 2024-09-27 RX ADMIN — BACITRACIN: 500 OINTMENT TOPICAL at 09:55

## 2024-09-27 RX ADMIN — SENNOSIDES, DOCUSATE SODIUM 1 TABLET: 50; 8.6 TABLET, FILM COATED ORAL at 08:52

## 2024-09-27 RX ADMIN — TROSPIUM CHLORIDE 20 MG: 20 TABLET, FILM COATED ORAL at 06:05

## 2024-09-27 RX ADMIN — ACETAMINOPHEN 650 MG: 325 TABLET ORAL at 10:54

## 2024-09-27 RX ADMIN — ACETAMINOPHEN 650 MG: 325 TABLET ORAL at 03:40

## 2024-09-27 RX ADMIN — PANTOPRAZOLE SODIUM 40 MG: 40 TABLET, DELAYED RELEASE ORAL at 08:52

## 2024-09-27 ASSESSMENT — PAIN SCALES - GENERAL
PAINLEVEL_OUTOF10: 3
PAINLEVEL_OUTOF10: 4
PAINLEVEL_OUTOF10: 4

## 2024-09-27 ASSESSMENT — PAIN DESCRIPTION - LOCATION
LOCATION: PENIS;ABDOMEN
LOCATION: ABDOMEN

## 2024-09-27 ASSESSMENT — PAIN - FUNCTIONAL ASSESSMENT
PAIN_FUNCTIONAL_ASSESSMENT: ACTIVITIES ARE NOT PREVENTED
PAIN_FUNCTIONAL_ASSESSMENT: ACTIVITIES ARE NOT PREVENTED

## 2024-09-27 ASSESSMENT — PAIN DESCRIPTION - FREQUENCY: FREQUENCY: CONTINUOUS

## 2024-09-27 ASSESSMENT — PAIN DESCRIPTION - ORIENTATION: ORIENTATION: MID

## 2024-09-27 ASSESSMENT — PAIN DESCRIPTION - DESCRIPTORS
DESCRIPTORS: ACHING
DESCRIPTORS: ACHING

## 2024-09-27 ASSESSMENT — PAIN DESCRIPTION - ONSET: ONSET: ON-GOING

## 2024-09-27 ASSESSMENT — PAIN DESCRIPTION - PAIN TYPE: TYPE: SURGICAL PAIN

## 2024-09-27 NOTE — CARE COORDINATION
Discharge Planning Note:    Chart reviewed and it appears that patient has minimal needs for discharge at this time. Risk Score 69 %     Primary Care Physician is Jose Mendes MD    Primary insurance is MEDICARE     Please notify case management if any discharge needs are identified.      Case management will continue to follow progress and update discharge plan as needed.     Electronically signed by JOSE L Green on 9/27/2024 at 10:55 AM

## 2024-09-27 NOTE — PROGRESS NOTES
Shift assessment complete at 2146.  VSS, A&Ox4, BS active, on RA.  Patient reports no BM since yesterday which is abnormal for him because he has IBS.  CBI stephens in place with red output.  Abdominal incisions CDI.  Penis has some dried blood around it, patient says it is from when he tried to go to the bathroom earlier.  Care plan discussed, patient mutually agrees.  Call light within reach, no further needs at this time.  Ambulated around the hallway.  Stephens care complete.    Ilsa Petersen RN

## 2024-09-27 NOTE — PROGRESS NOTES
Urology Progress Note  [facility]     Patient: Michael Canales MRN: [id number]  Room/Bed: [facility]4460/4460-01   YOB: 1955  Age/Sex: 69 y.o.male  Admission Date: 9/26/2024     Date of Service:  9/27/2024    ASSESSMENT/PLAN     POD 1 Robotic assisted laparoscopic transvesical simple prostatectomy   Doing well, pain controlled. Abdomen soft incisions CDI. Tolerating PO. No flatus yet. Ambulating okay. Stephens CYU with slow CBI.   Afvss. Cr stable. H/h stable.     Plan:  Infection: radha-operative antibiotics for 24 hours after surgery, monitor WBC and vitals  GI: tolerating PO intake. PRN zofran. Senna S BID and dulcolax daily  Diet: advance as tolerated to regular  Pain: IV and PO pain medications  Pulm: encourage IS 10 x hourly  Vitals: per nursing routine  Stephens catheter: management per urology  Prophylaxis: SCDs while in bed, patient is encouraged to be in a chair or ambulating today. lovenox SC  Clamp CBI. Discharge with stephens in place. Outpatient follow up x1 week for cath removal with Dr. Mera.     All patient questions were answered. He understands the plan as listed above.    SUBJECTIVE     Chief Complaint: No chief complaint on file.      24 Hour Events: Today patient reports doing great.  Otherwise pain is adequately controlled.  Tolerating diet.  Denies nausea/vomiting.  Ambulating.  No oxygen requirement.  No  symptoms.    OBJECTIVE     Hospital Problem List:  Principal Problem:    Benign prostatic hyperplasia with lower urinary tract symptoms  Active Problems:    BPH with obstruction/lower urinary tract symptoms  Resolved Problems:    * No resolved hospital problems. *      Physical Exam:  Vitals:    09/27/24 0745   BP: 130/82   Pulse: 86   Resp: 16   Temp: 98.2 °F (36.8 °C)   SpO2: 96%     CONSTITUTIONAL: The patient is well nourished/developed, with no distress noted.   NEUROLOGICAL/PSYCHIATRIC: Oriented to place and time, normal affected noted.   CARDIOVASCULAR:  Regular rate and rhythm, no evidence of swelling noted.   RESPIRATORY: Normal respiratory effort with no wheezing noted.   ABDOMEN: Abdomen soft, non-tender, non-distended. No enlarged liver or spleen. No hernias noted. Incisions CDI  GENITOURINARY: Leon draining cyu with slow cbi     Ins/Outs:    Intake/Output Summary (Last 24 hours) at 9/27/2024 0858  Last data filed at 9/27/2024 0816  Gross per 24 hour   Intake 1100 ml   Output 2925 ml   Net -1825 ml       Labs:  CBC   Lab Results   Component Value Date/Time    WBC 4.2 08/14/2024 12:00 AM    RBC 4.98 08/14/2024 12:00 AM    HGB 13.3 09/27/2024 05:25 AM    HCT 38.7 09/27/2024 05:25 AM    MCV 95 08/14/2024 12:00 AM    MCH 30.3 08/14/2024 12:00 AM    MCHC 31.8 08/14/2024 12:00 AM    RDW 11.7 08/01/2023 08:20 AM     08/14/2024 12:00 AM    MPV 9.4 08/15/2014 08:23 AM     BMP   Lab Results   Component Value Date/Time     09/27/2024 05:25 AM    K 4.7 09/27/2024 05:25 AM     09/27/2024 05:25 AM    CO2 32 09/27/2024 05:25 AM    BUN 20 09/27/2024 05:25 AM    CREATININE 1.1 09/27/2024 05:25 AM    GLUCOSE 144 09/27/2024 05:25 AM    CALCIUM 9.1 09/27/2024 05:25 AM     Urinalysis:   Lab Results   Component Value Date/Time    COLORU yellow 06/19/2020 09:02 AM    GLUCOSEU neg 06/19/2020 09:02 AM    BLOODU neg 06/19/2020 09:02 AM    LEUKOCYTESUR small 06/19/2020 09:02 AM     Urine culture: No results for input(s): \"LABURIN\" in the last 72 hours.  PSA:   Lab Results   Component Value Date    PSA 2.2 08/14/2024    PSA 3.4 08/01/2023    PSA 3.9 08/09/2022        Imaging:  XR CHEST (2 VW)    Result Date: 9/11/2024  EXAMINATION: TWO XRAY VIEWS OF THE CHEST 9/11/2024 12:06 pm COMPARISON: Chest x-ray dated 03/29/2010. HISTORY: ORDERING SYSTEM PROVIDED HISTORY: Preop testing TECHNOLOGIST PROVIDED HISTORY: Reason for Exam: pre op FINDINGS: HEART/MEDIASTINUM: The cardiomediastinal silhouette is within normal limits. PLEURA/LUNGS: There are no focal consolidations or

## 2024-09-27 NOTE — PROGRESS NOTES
CLINICAL PHARMACY NOTE: MEDS TO BEDS    Total # of Prescriptions Filled: 3   The following medications were delivered to the patient:  Stimulant laxative 8.6/50mg  Hydrocodone/APAP 5/325mg  Bactrim /160mg    Additional Documentation:    Medications were picked up in the Outpatient Pharmacy by patient's spouse Jolanta Hoang [facility]

## 2024-09-30 ENCOUNTER — TELEPHONE (OUTPATIENT)
Dept: PRIMARY CARE CLINIC | Age: 69
End: 2024-09-30

## 2024-09-30 NOTE — TELEPHONE ENCOUNTER
Pt returning missed call. Pt stated he was at the hospital for laparoscopic simple prostatectomy . Stated Dr. Mendes is aware and is not in need of an appt at this time. Pt has follow up appt scheduled with his Urologist.

## 2024-10-01 NOTE — TELEPHONE ENCOUNTER
Nelli Hernandez MM    9/30/24 10:32 AM  Note     Pt returning missed call. Pt stated he was at the hospital for laparoscopic simple prostatectomy . Stated Dr. Mendes is aware and is not in need of an appt at this time. Pt has follow up appt scheduled with his Urologist.

## 2024-10-01 NOTE — TELEPHONE ENCOUNTER
Care Transitions Initial Follow Up Call    Outreach made within 2 business days of discharge: Yes    Patient: Michael Canales Patient : 1955   MRN: [id number]  Reason for Admission: BPH  Discharge Date: 24       Spoke with: Patient    Discharge department/facility: [facility] Interactive Patient Contact:    Additional needs identified to be addressed with provider  Nelli Hernandez MM    24 10:32 AM  Note     Pt returning missed call. Pt stated he was at the hospital for laparoscopic simple prostatectomy . Stated Dr. Mendes is aware and is not in need of an appt at this time. Pt has follow up appt scheduled with his Urologist.                        Shikha Calderon MA

## 2024-10-25 DIAGNOSIS — E78.00 PURE HYPERCHOLESTEROLEMIA: Chronic | ICD-10-CM

## 2024-10-28 RX ORDER — ATORVASTATIN CALCIUM 10 MG/1
10 TABLET, FILM COATED ORAL DAILY
Qty: 90 TABLET | Refills: 0 | Status: SHIPPED | OUTPATIENT
Start: 2024-10-28

## 2025-01-26 DIAGNOSIS — E78.00 PURE HYPERCHOLESTEROLEMIA: Chronic | ICD-10-CM

## 2025-01-27 RX ORDER — ATORVASTATIN CALCIUM 10 MG/1
10 TABLET, FILM COATED ORAL DAILY
Qty: 90 TABLET | Refills: 0 | Status: SHIPPED | OUTPATIENT
Start: 2025-01-27

## 2025-04-24 ENCOUNTER — TELEPHONE (OUTPATIENT)
Dept: PRIMARY CARE CLINIC | Age: 70
End: 2025-04-24

## 2025-04-24 DIAGNOSIS — E78.00 PURE HYPERCHOLESTEROLEMIA: Chronic | ICD-10-CM

## 2025-04-24 RX ORDER — ATORVASTATIN CALCIUM 10 MG/1
10 TABLET, FILM COATED ORAL DAILY
Qty: 90 TABLET | Refills: 0 | OUTPATIENT
Start: 2025-04-24

## 2025-04-24 NOTE — TELEPHONE ENCOUNTER
Patient is scheduled for 4/30/2025 for his AWV. He asked if Dr Mendes can order labs for him to complete and wanted to ask if his insurance will cover the cost of labs since they will be used for his AWV.

## 2025-04-25 DIAGNOSIS — N40.1 BENIGN PROSTATIC HYPERPLASIA WITH URINARY FREQUENCY: ICD-10-CM

## 2025-04-25 DIAGNOSIS — E78.00 PURE HYPERCHOLESTEROLEMIA: Primary | Chronic | ICD-10-CM

## 2025-04-25 DIAGNOSIS — R35.0 BENIGN PROSTATIC HYPERPLASIA WITH URINARY FREQUENCY: ICD-10-CM

## 2025-04-25 NOTE — TELEPHONE ENCOUNTER
Pt is aware labs are in placed. Pt was advise to call his health insurance and make sure this labs are covered first since this is pt's concerns.

## 2025-04-28 SDOH — ECONOMIC STABILITY: FOOD INSECURITY: WITHIN THE PAST 12 MONTHS, THE FOOD YOU BOUGHT JUST DIDN'T LAST AND YOU DIDN'T HAVE MONEY TO GET MORE.: NEVER TRUE

## 2025-04-28 SDOH — ECONOMIC STABILITY: INCOME INSECURITY: IN THE LAST 12 MONTHS, WAS THERE A TIME WHEN YOU WERE NOT ABLE TO PAY THE MORTGAGE OR RENT ON TIME?: NO

## 2025-04-28 SDOH — HEALTH STABILITY: PHYSICAL HEALTH: ON AVERAGE, HOW MANY DAYS PER WEEK DO YOU ENGAGE IN MODERATE TO STRENUOUS EXERCISE (LIKE A BRISK WALK)?: 4 DAYS

## 2025-04-28 SDOH — HEALTH STABILITY: PHYSICAL HEALTH: ON AVERAGE, HOW MANY MINUTES DO YOU ENGAGE IN EXERCISE AT THIS LEVEL?: 120 MIN

## 2025-04-28 SDOH — ECONOMIC STABILITY: FOOD INSECURITY: WITHIN THE PAST 12 MONTHS, YOU WORRIED THAT YOUR FOOD WOULD RUN OUT BEFORE YOU GOT MONEY TO BUY MORE.: NEVER TRUE

## 2025-04-28 SDOH — ECONOMIC STABILITY: TRANSPORTATION INSECURITY
IN THE PAST 12 MONTHS, HAS THE LACK OF TRANSPORTATION KEPT YOU FROM MEDICAL APPOINTMENTS OR FROM GETTING MEDICATIONS?: NO

## 2025-04-28 ASSESSMENT — LIFESTYLE VARIABLES
HOW MANY STANDARD DRINKS CONTAINING ALCOHOL DO YOU HAVE ON A TYPICAL DAY: 0
HOW OFTEN DO YOU HAVE SIX OR MORE DRINKS ON ONE OCCASION: 1
HOW OFTEN DO YOU HAVE A DRINK CONTAINING ALCOHOL: NEVER
HOW MANY STANDARD DRINKS CONTAINING ALCOHOL DO YOU HAVE ON A TYPICAL DAY: PATIENT DOES NOT DRINK
HOW OFTEN DO YOU HAVE A DRINK CONTAINING ALCOHOL: 1

## 2025-04-28 ASSESSMENT — PATIENT HEALTH QUESTIONNAIRE - PHQ9
SUM OF ALL RESPONSES TO PHQ QUESTIONS 1-9: 0
SUM OF ALL RESPONSES TO PHQ QUESTIONS 1-9: 0
1. LITTLE INTEREST OR PLEASURE IN DOING THINGS: NOT AT ALL
2. FEELING DOWN, DEPRESSED OR HOPELESS: NOT AT ALL
SUM OF ALL RESPONSES TO PHQ QUESTIONS 1-9: 0
SUM OF ALL RESPONSES TO PHQ QUESTIONS 1-9: 0

## 2025-04-29 ENCOUNTER — RESULTS FOLLOW-UP (OUTPATIENT)
Dept: PRIMARY CARE CLINIC | Age: 70
End: 2025-04-29

## 2025-04-29 LAB
ALBUMIN: 4.3 G/DL (ref 3.9–4.9)
ALP BLD-CCNC: 54 IU/L (ref 44–121)
ALT SERPL-CCNC: 20 IU/L (ref 0–44)
AST SERPL-CCNC: 29 IU/L (ref 0–40)
BASOPHILS ABSOLUTE: 0 X10E3/UL (ref 0–0.2)
BASOPHILS RELATIVE PERCENT: 1 %
BILIRUB SERPL-MCNC: 0.7 MG/DL (ref 0–1.2)
BUN / CREAT RATIO: 21 (ref 10–24)
BUN BLDV-MCNC: 22 MG/DL (ref 8–27)
CALCIUM SERPL-MCNC: 9.8 MG/DL (ref 8.6–10.2)
CHLORIDE BLD-SCNC: 103 MMOL/L (ref 96–106)
CHOLESTEROL, TOTAL: 153 MG/DL (ref 100–199)
CO2: 27 MMOL/L (ref 20–29)
CREAT SERPL-MCNC: 1.05 MG/DL (ref 0.76–1.27)
EOSINOPHILS ABSOLUTE: 0.2 X10E3/UL (ref 0–0.4)
EOSINOPHILS RELATIVE PERCENT: 4 %
ESTIMATED GLOMERULAR FILTRATION RATE CREATININE EQUATION: 77 ML/MIN/1.73
GLOBULIN: 2 G/DL (ref 1.5–4.5)
GLUCOSE BLD-MCNC: 90 MG/DL (ref 70–99)
HCT VFR BLD CALC: 47.1 % (ref 37.5–51)
HDLC SERPL-MCNC: 61 MG/DL
HEMOGLOBIN: 15.3 G/DL (ref 13–17.7)
IMMATURE GRANS (ABS): 0 X10E3/UL (ref 0–0.1)
IMMATURE GRANULOCYTES %: 0 %
LDL CHOLESTEROL: 81 MG/DL (ref 0–99)
LYMPHOCYTES ABSOLUTE: 1.2 X10E3/UL (ref 0.7–3.1)
LYMPHOCYTES RELATIVE PERCENT: 29 %
MCH RBC QN AUTO: 30.8 PG (ref 26.6–33)
MCHC RBC AUTO-ENTMCNC: 32.5 G/DL (ref 31.5–35.7)
MCV RBC AUTO: 95 FL (ref 79–97)
MONOCYTES ABSOLUTE: 0.4 X10E3/UL (ref 0.1–0.9)
MONOCYTES RELATIVE PERCENT: 10 %
NEUTROPHILS ABSOLUTE: 2.4 X10E3/UL (ref 1.4–7)
NEUTROPHILS RELATIVE PERCENT: 56 %
PDW BLD-RTO: 11.9 % (ref 11.6–15.4)
PLATELET # BLD: 188 X10E3/UL (ref 150–450)
POTASSIUM SERPL-SCNC: 4.5 MMOL/L (ref 3.5–5.2)
PROSTATE SPECIFIC ANTIGEN: 0.9 NG/ML (ref 0–4)
RBC # BLD: 4.97 X10E6/UL (ref 4.14–5.8)
SODIUM BLD-SCNC: 144 MMOL/L (ref 134–144)
TOTAL PROTEIN: 6.3 G/DL (ref 6–8.5)
TRIGL SERPL-MCNC: 49 MG/DL (ref 0–149)
VLDLC SERPL CALC-MCNC: 11 MG/DL (ref 5–40)
WBC # BLD: 4.2 X10E3/UL (ref 3.4–10.8)

## 2025-04-30 ENCOUNTER — OFFICE VISIT (OUTPATIENT)
Dept: PRIMARY CARE CLINIC | Age: 70
End: 2025-04-30

## 2025-04-30 VITALS
SYSTOLIC BLOOD PRESSURE: 162 MMHG | BODY MASS INDEX: 24.16 KG/M2 | OXYGEN SATURATION: 98 % | WEIGHT: 172.6 LBS | HEART RATE: 77 BPM | DIASTOLIC BLOOD PRESSURE: 94 MMHG | HEIGHT: 71 IN

## 2025-04-30 DIAGNOSIS — G47.30 SLEEP APNEA, UNSPECIFIED TYPE: ICD-10-CM

## 2025-04-30 DIAGNOSIS — Z85.828 ENCOUNTER FOR FOLLOW-UP SURVEILLANCE OF SKIN CANCER: ICD-10-CM

## 2025-04-30 DIAGNOSIS — Z08 ENCOUNTER FOR FOLLOW-UP SURVEILLANCE OF SKIN CANCER: ICD-10-CM

## 2025-04-30 DIAGNOSIS — I10 PRIMARY HYPERTENSION: ICD-10-CM

## 2025-04-30 DIAGNOSIS — Z00.00 MEDICARE ANNUAL WELLNESS VISIT, SUBSEQUENT: Primary | ICD-10-CM

## 2025-04-30 DIAGNOSIS — E78.00 PURE HYPERCHOLESTEROLEMIA: Chronic | ICD-10-CM

## 2025-04-30 DIAGNOSIS — Z94.4 LIVER TRANSPLANT STATUS (HCC): ICD-10-CM

## 2025-04-30 DIAGNOSIS — G25.0 ESSENTIAL TREMOR: Chronic | ICD-10-CM

## 2025-04-30 RX ORDER — AMLODIPINE BESYLATE 5 MG/1
5 TABLET ORAL DAILY
Qty: 30 TABLET | Refills: 1 | Status: SHIPPED | OUTPATIENT
Start: 2025-04-30

## 2025-04-30 RX ORDER — ATORVASTATIN CALCIUM 10 MG/1
10 TABLET, FILM COATED ORAL DAILY
Qty: 90 TABLET | Refills: 1 | Status: SHIPPED | OUTPATIENT
Start: 2025-04-30

## 2025-04-30 NOTE — PROGRESS NOTES
surveillance    MD Jose Lares MD      Electronically signed by Jose Mendes MD on 4/30/2025 at 10:55 AM         Medicare Annual Wellness Visit    Michael Canales is here for Medicare AWV (Per pt here for his AWV.) and Discuss Labs (Pt is here to discuss lab results. )    Assessment & Plan   Essential tremor  Pure hypercholesterolemia  The following orders have not been finalized:  -     atorvastatin (LIPITOR) 10 MG tablet  Sleep apnea, unspecified type  Primary hypertension  The following orders have not been finalized:  -     amLODIPine (NORVASC) 5 MG tablet  Encounter for follow-up surveillance of skin cancer       Return in 4 weeks (on 5/28/2025) for elevated cholesterol, hypertension.     Subjective   The following acute and/or chronic problems were also addressed today:  No issues    Patient's complete Health Risk Assessment and screening values have been reviewed and are found in Flowsheets. The following problems were reviewed today and where indicated follow up appointments were made and/or referrals ordered.                    Hearing Screen:  Do you or your family notice any trouble with your hearing that hasn't been managed with hearing aids?: (!) (Patient-Rptd) Yes    Interventions:  Pt uses OTC hearing augmentation and is not interested in hearing tests today               Objective   Vitals:    04/30/25 1041 04/30/25 1044   BP: (!) 162/94 (!) 162/94   BP Site: Right Upper Arm    Patient Position: Sitting    BP Cuff Size: Large Adult    Pulse: 77    SpO2: 98%    Weight: 78.3 kg (172 lb 9.6 oz)    Height: 1.803 m (5' 11\")       Body mass index is 24.07 kg/m².        General Appearance: alert and oriented to person, place and time, well developed and well- nourished, in no acute distress  Skin: warm and dry, no rash or erythema  Head: normocephalic and atraumatic  Eyes: pupils equal, round, and reactive to light, extraocular eye movements intact, conjunctivae normal  ENT:

## 2025-04-30 NOTE — PATIENT INSTRUCTIONS
Read food labels and try to avoid saturated and trans fats. They increase your risk of heart disease by raising cholesterol levels.     Limit the amount of solid fat--butter, margarine, and shortening--you eat. Use olive, peanut, or canola oil when you cook. Bake, broil, and steam foods instead of frying them.     Eat a variety of fruit and vegetables every day. Dark green, deep orange, red, or yellow fruits and vegetables are especially good for you. Examples include spinach, carrots, peaches, and berries.     Foods high in fiber can reduce your cholesterol and provide important vitamins and minerals. High-fiber foods include whole-grain cereals and breads, oatmeal, beans, brown rice, citrus fruits, and apples.     Eat lean proteins. Heart-healthy proteins include seafood, lean meats and poultry, eggs, beans, peas, nuts, seeds, and soy products.     Limit drinks and foods with added sugar. These include candy, desserts, and soda pop.   Heart-healthy lifestyle    If your doctor recommends it, get more exercise. For many people, walking is a good choice. Or you may want to swim, bike, or do other activities. Bit by bit, increase the time you're active every day. Try for at least 30 minutes on most days of the week.     Try to quit or cut back on using tobacco and other nicotine products. This includes smoking and vaping. If you need help quitting, talk to your doctor about stop-smoking programs and medicines. These can increase your chances of quitting for good. Quitting is one of the most important things you can do to protect your heart. It is never too late to quit. Try to avoid secondhand smoke too.     Stay at a weight that's healthy for you. Talk to your doctor if you need help losing weight.     Try to get 7 to 9 hours of sleep each night.     Limit alcohol to 2 drinks a day for men and 1 drink a day for women. Too much alcohol can cause health problems.     Manage other health problems such as diabetes, high

## 2025-05-28 ENCOUNTER — CLINICAL SUPPORT (OUTPATIENT)
Dept: PRIMARY CARE CLINIC | Age: 70
End: 2025-05-28

## 2025-05-28 VITALS — HEART RATE: 81 BPM | OXYGEN SATURATION: 99 % | SYSTOLIC BLOOD PRESSURE: 146 MMHG | DIASTOLIC BLOOD PRESSURE: 82 MMHG

## 2025-05-28 DIAGNOSIS — E78.00 PURE HYPERCHOLESTEROLEMIA: Chronic | ICD-10-CM

## 2025-05-28 DIAGNOSIS — N40.1 BENIGN PROSTATIC HYPERPLASIA WITH URINARY FREQUENCY: ICD-10-CM

## 2025-05-28 DIAGNOSIS — R35.0 BENIGN PROSTATIC HYPERPLASIA WITH URINARY FREQUENCY: ICD-10-CM

## 2025-05-28 NOTE — PROGRESS NOTES
Pt here as nurse visit for blood pressure check. Pt currently taking Amlodipine 5 MG one tab daily. Per pt he took Amlodipine 5 MG one tablet today around 6:30 AM.    Per  via epic message as followed:   Low salt diet and continue Amlodipine 5mg daily. Pt is aware of  recommendation. Pt verbalized understanding. Pt had no further questions at this time.

## 2025-06-20 DIAGNOSIS — I10 PRIMARY HYPERTENSION: ICD-10-CM

## 2025-06-20 RX ORDER — AMLODIPINE BESYLATE 5 MG/1
5 TABLET ORAL DAILY
Qty: 30 TABLET | Refills: 2 | Status: SHIPPED | OUTPATIENT
Start: 2025-06-20

## 2025-06-20 NOTE — TELEPHONE ENCOUNTER
Recent Visits  Date Type Provider Dept   04/30/25 Office Visit Jose Mendes MD [facility]   09/11/24 Office Visit Jose Mendes MD [facility]   02/06/24 Office Visit Jose Mendes MD [facility]   Showing recent visits within past 540 days with a meds authorizing provider and meeting all other requirements  Future Appointments  Date Type Provider Dept   06/23/25 Appointment Jose Mendes MD [facility]   Showing future appointments within next 150 days with a meds authorizing provider and meeting all other requirements     5/28/2025

## 2025-06-23 ENCOUNTER — OFFICE VISIT (OUTPATIENT)
Dept: PRIMARY CARE CLINIC | Age: 70
End: 2025-06-23
Payer: MEDICARE

## 2025-06-23 VITALS
OXYGEN SATURATION: 99 % | SYSTOLIC BLOOD PRESSURE: 142 MMHG | HEART RATE: 73 BPM | WEIGHT: 171.2 LBS | BODY MASS INDEX: 23.88 KG/M2 | DIASTOLIC BLOOD PRESSURE: 80 MMHG

## 2025-06-23 DIAGNOSIS — I10 PRIMARY HYPERTENSION: ICD-10-CM

## 2025-06-23 DIAGNOSIS — E78.00 PURE HYPERCHOLESTEROLEMIA: Primary | Chronic | ICD-10-CM

## 2025-06-23 PROCEDURE — 3077F SYST BP >= 140 MM HG: CPT | Performed by: INTERNAL MEDICINE

## 2025-06-23 PROCEDURE — 3079F DIAST BP 80-89 MM HG: CPT | Performed by: INTERNAL MEDICINE

## 2025-06-23 PROCEDURE — 1036F TOBACCO NON-USER: CPT | Performed by: INTERNAL MEDICINE

## 2025-06-23 PROCEDURE — 1159F MED LIST DOCD IN RCRD: CPT | Performed by: INTERNAL MEDICINE

## 2025-06-23 PROCEDURE — G8420 CALC BMI NORM PARAMETERS: HCPCS | Performed by: INTERNAL MEDICINE

## 2025-06-23 PROCEDURE — 99214 OFFICE O/P EST MOD 30 MIN: CPT | Performed by: INTERNAL MEDICINE

## 2025-06-23 PROCEDURE — 1123F ACP DISCUSS/DSCN MKR DOCD: CPT | Performed by: INTERNAL MEDICINE

## 2025-06-23 PROCEDURE — G8427 DOCREV CUR MEDS BY ELIG CLIN: HCPCS | Performed by: INTERNAL MEDICINE

## 2025-06-23 PROCEDURE — 3017F COLORECTAL CA SCREEN DOC REV: CPT | Performed by: INTERNAL MEDICINE

## 2025-06-23 RX ORDER — AMLODIPINE BESYLATE 5 MG/1
5 TABLET ORAL DAILY
Qty: 90 TABLET | Refills: 1 | Status: SHIPPED | OUTPATIENT
Start: 2025-06-23

## 2025-06-23 RX ORDER — ATORVASTATIN CALCIUM 10 MG/1
10 TABLET, FILM COATED ORAL DAILY
Qty: 90 TABLET | Refills: 1 | Status: SHIPPED | OUTPATIENT
Start: 2025-06-23

## 2025-06-23 NOTE — PROGRESS NOTES
6/23/2025   Michael Canales  1955    The patients PMH, surgical history, family history, medications, allergies were all reviewed and updated as appropriate today.     Current Outpatient Medications on File Prior to Visit   Medication Sig Dispense Refill    loperamide (IMODIUM A-D) 2 MG tablet Take 0.5 tablets by mouth daily      triamcinolone (KENALOG) 0.1 % ointment APPLY TOPICALLY TO HANDS TWICE DAILY, AND TO FEET ONCE DAILY FOR 1 WEEK AS NEEDED      pantoprazole (PROTONIX) 40 MG tablet TAKE 1 TABLET BY MOUTH ONCE DAILY      Omega-3 Fatty Acids (FISH OIL) 1200 MG CAPS Take 1,200 mg by mouth daily      therapeutic multivitamin-minerals (THERAGRAN-M) tablet Take 1 tablet by mouth daily      Calcium Carb-Cholecalciferol 600-500 MG-UNIT CAPS Take 1 tablet by mouth daily.         No current facility-administered medications on file prior to visit.        Chief Complaint   Patient presents with    Blood Pressure Check     Per pt following up on blood pressure check.     Medication Refill     Pt is requesting refills on all medications.        HPI:  wants refills on all meds today  Due for next labs in 6 months    Review of Systems  C/o dysthymia due to wife's joint problems  OBJECTIVE:  BP (!) 142/80   Pulse 73   Wt 77.7 kg (171 lb 3.2 oz)   SpO2 99%   BMI 23.88 kg/m²    Wt Readings from Last 3 Encounters:   06/23/25 77.7 kg (171 lb 3.2 oz)   04/30/25 78.3 kg (172 lb 9.6 oz)   09/26/24 74.5 kg (164 lb 4.8 oz)       Physical Exam  Vitals and nursing note reviewed.   Constitutional:       General: He is not in acute distress.     Appearance: He is well-developed.      Comments: BP (!) 142/80   Pulse 73   Wt 77.7 kg (171 lb 3.2 oz)   SpO2 99%   BMI 23.88 kg/m²    HENT:      Head: Normocephalic and atraumatic.   Eyes:      General: No scleral icterus.        Right eye: No discharge.         Left eye: No discharge.      Conjunctiva/sclera: Conjunctivae normal.      Pupils: Pupils are equal, round, and

## (undated) DEVICE — PENCIL ES L3M BTTN SWCH S STL HEX LOK BLDE ELECTRD HOLSTER

## (undated) DEVICE — INSUFFLATION NEEDLE TO ESTABLISH PNEUMOPERITONEUM.: Brand: INSUFFLATION NEEDLE

## (undated) DEVICE — SOLUTION IRRIG 1000ML 0.9% SOD CHL USP POUR PLAS BTL

## (undated) DEVICE — AIRSEAL 8 MM ACCESS PORT AND LOW PROFILE OBTURATOR WITH BLADELESS OPTICAL TIP, 100 MM LENGTH: Brand: AIRSEAL

## (undated) DEVICE — HYDROGEL COATED LATEX FOLEY CATHETER, COUDE TIP, 5 CC, 2-WAY, 16 FR (5.3 MM), RED LATES: Brand: DOVER

## (undated) DEVICE — STERILE LATEX POWDER FREE SURGICAL GLOVES WITH HYDROGEL COATING: Brand: PROTEXIS

## (undated) DEVICE — LARGE NEEDLE DRIVER: Brand: ENDOWRIST

## (undated) DEVICE — BLADE CLIPPER GEN PURP NS

## (undated) DEVICE — CATHETER URETH 20FR 30CC BLLN SIL ELASTMR F 2 W

## (undated) DEVICE — BAG RETRIEVAL SPECIMEN SUPERBAG 10 MEDIUM NYLON ITRODUCER

## (undated) DEVICE — TOTAL TRAY, DB, 100% SILI FOLEY, 16FR 10: Brand: MEDLINE

## (undated) DEVICE — SEAL

## (undated) DEVICE — 30978 SEE SHARP - ENHANCED INTRAOPERATIVE LAPAROSCOPE CLEANING & DEFOGGING: Brand: 30978 SEE SHARP - ENHANCED INTRAOPERATIVE LAPAROSCOPE CLEANING & DEFOGGING

## (undated) DEVICE — SYRINGE, LUER LOCK, 10ML: Brand: MEDLINE

## (undated) DEVICE — TRI-LUMEN FILTERED TUBE SET WITH ACTIVATED CHARCOAL FILTER: Brand: AIRSEAL

## (undated) DEVICE — SUTURE DEV SZ 3-0 V-LOC 90 L12IN TO L18IN CV-23 VLT VLOCM0844

## (undated) DEVICE — CATHETER URETH 18FR 30CC BLLN F 3 W SPEC M RND TIP TWO

## (undated) DEVICE — COVER,TABLE,77X90,STERILE: Brand: MEDLINE

## (undated) DEVICE — SET,IRRIGATION,CYSTO,Y-TYPE,90": Brand: MEDLINE

## (undated) DEVICE — BLANKET WRM W29.9XL79.1IN UP BODY FORC AIR MISTRAL-AIR

## (undated) DEVICE — WET SKIN PREP TRAY: Brand: MEDLINE INDUSTRIES, INC.

## (undated) DEVICE — BLADE ES ELASTOMERIC COAT INSUL DURABLE BEND UPTO 90DEG

## (undated) DEVICE — ARM DRAPE

## (undated) DEVICE — LIGHT HANDLE: Brand: DEVON

## (undated) DEVICE — SUTURE V-LOC 90 3-0 L6IN ABSRB UD CV-23 L17MM 1/2 CIR VLOCM1904

## (undated) DEVICE — SYRINGE MED 30ML STD CLR PLAS LUERLOCK TIP N CTRL DISP

## (undated) DEVICE — AIRSEAL 8 MM ACCESS PORT AND LOW PROFILE OBTURATOR WITH BLADELESS OPTICAL TIP, 120 MM LENGTH: Brand: AIRSEAL

## (undated) DEVICE — BLADELESS OBTURATOR: Brand: WECK VISTA

## (undated) DEVICE — 3M™ STERI-DRAPE™ INCISE DRAPE 1050 (60CM X 45CM): Brand: STERI-DRAPE™

## (undated) DEVICE — CATHETER URETH 16FR BLLN 5CC 2 W F SPEC M OLV COUDE TIP

## (undated) DEVICE — PMI DISPOSABLE PUNCTURE CLOSURE DEVICE / SUTURE GRASPER: Brand: PMI

## (undated) DEVICE — SUTURE VICRYL + SZ 0 L27IN ABSRB UD CT-1 L36MM 1/2 CIR TAPR VCP260H

## (undated) DEVICE — TIP COVER ACCESSORY

## (undated) DEVICE — CATHETER URETH 22FR 30CC BLLN F 3 W SPEC M RND TIP TWO

## (undated) DEVICE — GENERAL ROBOT: Brand: MEDLINE INDUSTRIES, INC.

## (undated) DEVICE — SUTURE MONOCRYL + SZ 4-0 L27IN ABSRB UD L19MM PS-2 3/8 CIR MCP426H

## (undated) DEVICE — ELECTRODE PT RET AD L9FT HI MOIST COND ADH HYDRGEL CORDED